# Patient Record
Sex: MALE | Race: WHITE | NOT HISPANIC OR LATINO
[De-identification: names, ages, dates, MRNs, and addresses within clinical notes are randomized per-mention and may not be internally consistent; named-entity substitution may affect disease eponyms.]

---

## 2017-01-03 ENCOUNTER — RESULT REVIEW (OUTPATIENT)
Age: 82
End: 2017-01-03

## 2017-01-03 ENCOUNTER — APPOINTMENT (OUTPATIENT)
Dept: INFUSION THERAPY | Facility: CLINIC | Age: 82
End: 2017-01-03

## 2017-01-03 LAB
ALBUMIN SERPL-MCNC: 3.6 G/DL
ALBUMIN/GLOB SERPL: 1.44
ALP SERPL-CCNC: 70 IU/L
ALT SERPL-CCNC: 14 IU/L
ANION GAP SERPL CALC-SCNC: 8 MEQ/L
AST SERPL-CCNC: 12 IU/L
BILIRUB SERPL-MCNC: 0.6 MG/DL
BUN SERPL-MCNC: 13 MG/DL
BUN/CREAT SERPL: 14.4 %
CALCIUM SERPL-MCNC: 8.5 MG/DL
CHLORIDE SERPL-SCNC: 114 MEQ/L
CO2 SERPL-SCNC: 20 MEQ/L
CREAT SERPL-MCNC: 0.9 MG/DL
GFR SERPL CREATININE-BSD FRML MDRD: 80
GLUCOSE SERPL-MCNC: 75 MG/DL
POTASSIUM SERPL-SCNC: 3.6 MMOL/L
PROT SERPL-MCNC: 6.1 G/DL
SODIUM SERPL-SCNC: 142 MEQ/L

## 2017-03-06 ENCOUNTER — APPOINTMENT (OUTPATIENT)
Dept: INFUSION THERAPY | Facility: CLINIC | Age: 82
End: 2017-03-06

## 2017-03-06 LAB
BASOPHILS # BLD: 0.05 TH/MM3
BASOPHILS NFR BLD: 0.8 %
EOSINOPHIL # BLD: 0.29 TH/MM3
EOSINOPHIL NFR BLD: 4.9 %
ERYTHROCYTE [DISTWIDTH] IN BLOOD BY AUTOMATED COUNT: 14.5 %
GRANULOCYTES # BLD: 3.89 TH/MM3
GRANULOCYTES NFR BLD: 65.4 %
HCT VFR BLD AUTO: 34.9 %
HGB BLD-MCNC: 11.4 G/DL
IMM GRANULOCYTES # BLD: 0.01 TH/MM3
IMM GRANULOCYTES NFR BLD: 0.2 %
LYMPHOCYTES # BLD: 1.16 TH/MM3
LYMPHOCYTES NFR BLD: 19.5 %
MCH RBC QN AUTO: 28.4 PG
MCHC RBC AUTO-ENTMCNC: 32.7 G/DL
MCV RBC AUTO: 87 FL
MONOCYTES # BLD: 0.55 TH/MM3
MONOCYTES NFR BLD: 9.2 %
PLATELET # BLD: 223 TH/MM3
PMV BLD AUTO: 9.2 FL
RBC # BLD AUTO: 4.01 MIL/MM3
WBC # BLD: 5.95 TH/MM3

## 2017-03-07 LAB
ALBUMIN SERPL-MCNC: 3.7 G/DL
ALBUMIN/GLOB SERPL: 1.48
ALP SERPL-CCNC: 71 IU/L
ALT SERPL-CCNC: 14 IU/L
ANION GAP SERPL CALC-SCNC: 10 MEQ/L
AST SERPL-CCNC: 15 IU/L
BILIRUB SERPL-MCNC: 0.8 MG/DL
BUN SERPL-MCNC: 12 MG/DL
BUN/CREAT SERPL: 14.8 %
CALCIUM SERPL-MCNC: 8.5 MG/DL
CHLORIDE SERPL-SCNC: 109 MEQ/L
CO2 SERPL-SCNC: 24 MEQ/L
CREAT SERPL-MCNC: 0.81 MG/DL
GFR SERPL CREATININE-BSD FRML MDRD: 91
GLUCOSE SERPL-MCNC: 68 MG/DL
POTASSIUM SERPL-SCNC: 4.1 MMOL/L
PROT SERPL-MCNC: 6.2 G/DL
SODIUM SERPL-SCNC: 143 MEQ/L

## 2017-03-27 ENCOUNTER — APPOINTMENT (OUTPATIENT)
Dept: HEMATOLOGY ONCOLOGY | Facility: CLINIC | Age: 82
End: 2017-03-27

## 2017-03-27 ENCOUNTER — APPOINTMENT (OUTPATIENT)
Dept: INFUSION THERAPY | Facility: CLINIC | Age: 82
End: 2017-03-27

## 2017-03-27 ENCOUNTER — OUTPATIENT (OUTPATIENT)
Dept: OUTPATIENT SERVICES | Facility: HOSPITAL | Age: 82
LOS: 1 days | Discharge: HOME | End: 2017-03-27

## 2017-03-27 VITALS — RESPIRATION RATE: 12 BRPM | SYSTOLIC BLOOD PRESSURE: 192 MMHG | HEART RATE: 82 BPM | DIASTOLIC BLOOD PRESSURE: 102 MMHG

## 2017-03-27 LAB
ALBUMIN SERPL-MCNC: 3.7 G/DL
ALBUMIN/GLOB SERPL: 1.37
ALP SERPL-CCNC: 66 IU/L
ALT SERPL-CCNC: 12 IU/L
ANION GAP SERPL CALC-SCNC: 12 MEQ/L
AST SERPL-CCNC: 12 IU/L
BASOPHILS # BLD: 0.06 TH/MM3
BASOPHILS NFR BLD: 0.9 %
BILIRUB SERPL-MCNC: 0.7 MG/DL
BUN SERPL-MCNC: 14 MG/DL
BUN/CREAT SERPL: 14.9 %
CALCIUM SERPL-MCNC: 8.9 MG/DL
CHLORIDE SERPL-SCNC: 111 MEQ/L
CO2 SERPL-SCNC: 21 MEQ/L
CREAT SERPL-MCNC: 0.94 MG/DL
EOSINOPHIL # BLD: 0.35 TH/MM3
EOSINOPHIL NFR BLD: 5.4 %
ERYTHROCYTE [DISTWIDTH] IN BLOOD BY AUTOMATED COUNT: 13.9 %
GFR SERPL CREATININE-BSD FRML MDRD: 76
GLUCOSE SERPL-MCNC: 74 MG/DL
GRANULOCYTES # BLD: 4.31 TH/MM3
GRANULOCYTES NFR BLD: 66.2 %
HCT VFR BLD AUTO: 34.9 %
HGB BLD-MCNC: 11.3 G/DL
IMM GRANULOCYTES # BLD: 0.01 TH/MM3
IMM GRANULOCYTES NFR BLD: 0.2 %
LYMPHOCYTES # BLD: 1.12 TH/MM3
LYMPHOCYTES NFR BLD: 17.2 %
MAGNESIUM SERPL-MCNC: 1.5 MG/DL
MCH RBC QN AUTO: 27.8 PG
MCHC RBC AUTO-ENTMCNC: 32.4 G/DL
MCV RBC AUTO: 86 FL
MONOCYTES # BLD: 0.66 TH/MM3
MONOCYTES NFR BLD: 10.1 %
PLATELET # BLD: 234 TH/MM3
PMV BLD AUTO: 9.4 FL
POTASSIUM SERPL-SCNC: 4 MMOL/L
PROT SERPL-MCNC: 6.4 G/DL
RBC # BLD AUTO: 4.06 MIL/MM3
SODIUM SERPL-SCNC: 144 MEQ/L
WBC # BLD: 6.51 TH/MM3

## 2017-03-28 LAB — TSH SERPL DL<=0.005 MIU/L-ACNC: 2.95 UIU/ML

## 2017-04-17 ENCOUNTER — APPOINTMENT (OUTPATIENT)
Dept: INFUSION THERAPY | Facility: CLINIC | Age: 82
End: 2017-04-17

## 2017-04-26 ENCOUNTER — APPOINTMENT (OUTPATIENT)
Dept: INFUSION THERAPY | Facility: CLINIC | Age: 82
End: 2017-04-26

## 2017-05-24 ENCOUNTER — APPOINTMENT (OUTPATIENT)
Dept: INFUSION THERAPY | Facility: CLINIC | Age: 82
End: 2017-05-24

## 2017-06-12 ENCOUNTER — APPOINTMENT (OUTPATIENT)
Dept: INFUSION THERAPY | Facility: CLINIC | Age: 82
End: 2017-06-12

## 2017-07-24 ENCOUNTER — OUTPATIENT (OUTPATIENT)
Dept: OUTPATIENT SERVICES | Facility: HOSPITAL | Age: 82
LOS: 1 days | Discharge: HOME | End: 2017-07-24

## 2017-07-24 DIAGNOSIS — I10 ESSENTIAL (PRIMARY) HYPERTENSION: ICD-10-CM

## 2017-07-24 DIAGNOSIS — C43.9 MALIGNANT MELANOMA OF SKIN, UNSPECIFIED: ICD-10-CM

## 2017-07-24 DIAGNOSIS — I50.9 HEART FAILURE, UNSPECIFIED: ICD-10-CM

## 2017-07-24 DIAGNOSIS — M48.00 SPINAL STENOSIS, SITE UNSPECIFIED: ICD-10-CM

## 2017-08-01 DIAGNOSIS — I49.01 VENTRICULAR FIBRILLATION: ICD-10-CM

## 2017-08-14 ENCOUNTER — APPOINTMENT (OUTPATIENT)
Dept: INFUSION THERAPY | Facility: CLINIC | Age: 82
End: 2017-08-14

## 2017-08-14 VITALS
HEART RATE: 76 BPM | SYSTOLIC BLOOD PRESSURE: 167 MMHG | RESPIRATION RATE: 14 BRPM | DIASTOLIC BLOOD PRESSURE: 102 MMHG | TEMPERATURE: 96.2 F

## 2017-09-11 ENCOUNTER — APPOINTMENT (OUTPATIENT)
Dept: INFUSION THERAPY | Facility: CLINIC | Age: 82
End: 2017-09-11

## 2017-09-11 ENCOUNTER — OUTPATIENT (OUTPATIENT)
Dept: OUTPATIENT SERVICES | Facility: HOSPITAL | Age: 82
LOS: 1 days | Discharge: HOME | End: 2017-09-11

## 2017-09-11 DIAGNOSIS — I10 ESSENTIAL (PRIMARY) HYPERTENSION: ICD-10-CM

## 2017-09-11 DIAGNOSIS — C43.9 MALIGNANT MELANOMA OF SKIN, UNSPECIFIED: ICD-10-CM

## 2017-09-11 DIAGNOSIS — I50.9 HEART FAILURE, UNSPECIFIED: ICD-10-CM

## 2017-09-11 DIAGNOSIS — M48.00 SPINAL STENOSIS, SITE UNSPECIFIED: ICD-10-CM

## 2017-09-16 ENCOUNTER — INPATIENT (INPATIENT)
Facility: HOSPITAL | Age: 82
LOS: 2 days | Discharge: HOME | End: 2017-09-19
Attending: INTERNAL MEDICINE

## 2017-09-16 DIAGNOSIS — C43.9 MALIGNANT MELANOMA OF SKIN, UNSPECIFIED: ICD-10-CM

## 2017-09-16 DIAGNOSIS — M48.00 SPINAL STENOSIS, SITE UNSPECIFIED: ICD-10-CM

## 2017-09-16 DIAGNOSIS — I50.9 HEART FAILURE, UNSPECIFIED: ICD-10-CM

## 2017-09-16 DIAGNOSIS — I10 ESSENTIAL (PRIMARY) HYPERTENSION: ICD-10-CM

## 2017-09-21 DIAGNOSIS — I50.9 HEART FAILURE, UNSPECIFIED: ICD-10-CM

## 2017-09-21 DIAGNOSIS — I42.0 DILATED CARDIOMYOPATHY: ICD-10-CM

## 2017-09-21 DIAGNOSIS — N17.9 ACUTE KIDNEY FAILURE, UNSPECIFIED: ICD-10-CM

## 2017-09-21 DIAGNOSIS — I25.10 ATHEROSCLEROTIC HEART DISEASE OF NATIVE CORONARY ARTERY WITHOUT ANGINA PECTORIS: ICD-10-CM

## 2017-09-21 DIAGNOSIS — Z98.1 ARTHRODESIS STATUS: ICD-10-CM

## 2017-09-21 DIAGNOSIS — Z96.653 PRESENCE OF ARTIFICIAL KNEE JOINT, BILATERAL: ICD-10-CM

## 2017-09-21 DIAGNOSIS — N40.0 BENIGN PROSTATIC HYPERPLASIA WITHOUT LOWER URINARY TRACT SYMPTOMS: ICD-10-CM

## 2017-09-21 DIAGNOSIS — I21.4 NON-ST ELEVATION (NSTEMI) MYOCARDIAL INFARCTION: ICD-10-CM

## 2017-09-21 DIAGNOSIS — L80 VITILIGO: ICD-10-CM

## 2017-09-21 DIAGNOSIS — Z90.49 ACQUIRED ABSENCE OF OTHER SPECIFIED PARTS OF DIGESTIVE TRACT: ICD-10-CM

## 2017-09-21 DIAGNOSIS — I11.0 HYPERTENSIVE HEART DISEASE WITH HEART FAILURE: ICD-10-CM

## 2017-09-21 DIAGNOSIS — Z85.820 PERSONAL HISTORY OF MALIGNANT MELANOMA OF SKIN: ICD-10-CM

## 2017-09-21 DIAGNOSIS — I50.23 ACUTE ON CHRONIC SYSTOLIC (CONGESTIVE) HEART FAILURE: ICD-10-CM

## 2017-09-21 DIAGNOSIS — K59.00 CONSTIPATION, UNSPECIFIED: ICD-10-CM

## 2017-09-21 DIAGNOSIS — Z87.891 PERSONAL HISTORY OF NICOTINE DEPENDENCE: ICD-10-CM

## 2017-09-29 ENCOUNTER — OUTPATIENT (OUTPATIENT)
Dept: OUTPATIENT SERVICES | Facility: HOSPITAL | Age: 82
LOS: 1 days | Discharge: HOME | End: 2017-09-29

## 2017-09-29 DIAGNOSIS — M48.00 SPINAL STENOSIS, SITE UNSPECIFIED: ICD-10-CM

## 2017-09-29 DIAGNOSIS — I50.9 HEART FAILURE, UNSPECIFIED: ICD-10-CM

## 2017-09-29 DIAGNOSIS — I25.10 ATHEROSCLEROTIC HEART DISEASE OF NATIVE CORONARY ARTERY WITHOUT ANGINA PECTORIS: ICD-10-CM

## 2017-09-29 DIAGNOSIS — C43.9 MALIGNANT MELANOMA OF SKIN, UNSPECIFIED: ICD-10-CM

## 2017-09-29 DIAGNOSIS — I10 ESSENTIAL (PRIMARY) HYPERTENSION: ICD-10-CM

## 2017-10-09 ENCOUNTER — APPOINTMENT (OUTPATIENT)
Dept: INFUSION THERAPY | Facility: CLINIC | Age: 82
End: 2017-10-09

## 2017-10-18 ENCOUNTER — APPOINTMENT (OUTPATIENT)
Dept: INFUSION THERAPY | Facility: CLINIC | Age: 82
End: 2017-10-18

## 2017-10-18 ENCOUNTER — APPOINTMENT (OUTPATIENT)
Dept: HEMATOLOGY ONCOLOGY | Facility: CLINIC | Age: 82
End: 2017-10-18

## 2017-10-23 ENCOUNTER — OUTPATIENT (OUTPATIENT)
Dept: OUTPATIENT SERVICES | Facility: HOSPITAL | Age: 82
LOS: 1 days | Discharge: HOME | End: 2017-10-23

## 2017-10-23 DIAGNOSIS — I10 ESSENTIAL (PRIMARY) HYPERTENSION: ICD-10-CM

## 2017-10-23 DIAGNOSIS — I51.9 HEART DISEASE, UNSPECIFIED: ICD-10-CM

## 2017-10-23 DIAGNOSIS — M48.00 SPINAL STENOSIS, SITE UNSPECIFIED: ICD-10-CM

## 2017-10-23 DIAGNOSIS — C43.9 MALIGNANT MELANOMA OF SKIN, UNSPECIFIED: ICD-10-CM

## 2017-10-23 DIAGNOSIS — I50.9 HEART FAILURE, UNSPECIFIED: ICD-10-CM

## 2017-11-01 DIAGNOSIS — I25.10 ATHEROSCLEROTIC HEART DISEASE OF NATIVE CORONARY ARTERY WITHOUT ANGINA PECTORIS: ICD-10-CM

## 2017-11-13 ENCOUNTER — APPOINTMENT (OUTPATIENT)
Dept: INFUSION THERAPY | Facility: CLINIC | Age: 82
End: 2017-11-13

## 2017-11-13 ENCOUNTER — APPOINTMENT (OUTPATIENT)
Dept: HEMATOLOGY ONCOLOGY | Facility: CLINIC | Age: 82
End: 2017-11-13

## 2017-11-13 VITALS
TEMPERATURE: 96.6 F | SYSTOLIC BLOOD PRESSURE: 141 MMHG | DIASTOLIC BLOOD PRESSURE: 70 MMHG | HEART RATE: 82 BPM | HEIGHT: 69 IN | WEIGHT: 212 LBS | BODY MASS INDEX: 31.4 KG/M2 | RESPIRATION RATE: 14 BRPM

## 2017-12-11 ENCOUNTER — APPOINTMENT (OUTPATIENT)
Dept: INFUSION THERAPY | Facility: CLINIC | Age: 82
End: 2017-12-11

## 2018-01-08 ENCOUNTER — APPOINTMENT (OUTPATIENT)
Dept: INFUSION THERAPY | Facility: CLINIC | Age: 83
End: 2018-01-08

## 2018-02-05 ENCOUNTER — APPOINTMENT (OUTPATIENT)
Dept: INFUSION THERAPY | Facility: CLINIC | Age: 83
End: 2018-02-05

## 2018-04-09 ENCOUNTER — OUTPATIENT (OUTPATIENT)
Dept: OUTPATIENT SERVICES | Facility: HOSPITAL | Age: 83
LOS: 1 days | Discharge: HOME | End: 2018-04-09

## 2018-04-09 ENCOUNTER — APPOINTMENT (OUTPATIENT)
Dept: INFUSION THERAPY | Facility: CLINIC | Age: 83
End: 2018-04-09

## 2018-04-09 DIAGNOSIS — C43.9 MALIGNANT MELANOMA OF SKIN, UNSPECIFIED: ICD-10-CM

## 2018-05-14 ENCOUNTER — APPOINTMENT (OUTPATIENT)
Dept: HEMATOLOGY ONCOLOGY | Facility: CLINIC | Age: 83
End: 2018-05-14

## 2018-05-14 ENCOUNTER — APPOINTMENT (OUTPATIENT)
Dept: INFUSION THERAPY | Facility: CLINIC | Age: 83
End: 2018-05-14

## 2018-05-14 VITALS
HEIGHT: 69 IN | WEIGHT: 208 LBS | RESPIRATION RATE: 14 BRPM | SYSTOLIC BLOOD PRESSURE: 153 MMHG | HEART RATE: 87 BPM | DIASTOLIC BLOOD PRESSURE: 87 MMHG | BODY MASS INDEX: 30.81 KG/M2 | TEMPERATURE: 96.2 F

## 2018-05-14 NOTE — HISTORY OF PRESENT ILLNESS
[Disease: _____________________] : Disease: [unfilled] [AJCC Stage: ____] : AJCC Stage: [unfilled] [de-identified] : This is an 84-year-old male with metastatic melanoma on Keytruda q3 weeks since 01/2015.  He follows with Dr. Sloan at [facility].  His most recent CT chest, abdomen, pelvis at [facility] in 09/2016 showed an unchanged LLL nodule and ileocolic meseneteric lymph nodes which have mildly decreased in size.  He is due for a PETCT tomorrow and has an appointment with Dr. Sloan next week.  \par \par He has no new complaints on therapy.  He has chronic intermittent diarrhea which is controlled on medication.   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  He has been on steroid creams and now is being treated with Kenalog injections under the care of Dr. Bowen.  He notes that the rash is improving.\par \par He has no other complaints.  Denies fever, shortness of breath, or chronic fatigue.  Denies any pain. [de-identified] : BRAF+ [de-identified] : His oncologic history is summarized below:\par \par 2002, primary diagnosis of Melanoma status post wide local excision, biopsy consistent with melanoma. \par 09/06/2013, laparoscopic surgery and resection of small bowel metastases.   09/28/2014, laparoscopy with lysis of adhesions.  Biopsy with mesenteric nodules negative for melanoma.  \par 06/13/2014 until 08/28/2014, vemurafenib.  This was stopped due to phototoxicity.\par 09/23/2014 until 12/11/2014, ipilimumab.  This was stopped due to progression.  \par 11/07/2014, resection of cecal metastasis and regional lymph nodes.  01/08/2015 until present, pembrolizumab, last dose 10/10/2016.\par  [de-identified] : 3/27/17\par He returned from [place]. Was treated there. States his PET/CT from December was fine. Has a new PET/CT scheduled for next Monday.  His treatment may be stopped if he is DEBRA as per patient. Skin rash is the same.\par \par 11/13/17\par He is feeling well. No acute rash, just scarring from the old rash. He reports a PET/CT in [facility] that was unchanged.  He also had a NSTEMI in September. He states he had a Cath and no intervention was needed just medical therapy. \par \par 5/14/18\par He is here for follow up. He reports CT scans in [facility] that were DEBRA from 3/5/18. Blood work from same day hgb 12.36, wbc 5, olts 215,  alb 4.2, alk 73, ast/alt 10/12, bili 0.5 cr. 0.9.  He feels well.

## 2018-05-14 NOTE — ASSESSMENT
[FreeTextEntry1] : 85/M with BRAF+ metastatic melanoma with disease involving the lung and intraabdominal lymph nodes and has been off Keytruda since April 2012  given 2 years of therapy and essentially unchanged scans. On observation.\par  \par Plan:\par -CT scan was DEBRA on 3/2018 now he states he obtains them every 4 months in [place] and blodo work there as well\par -Port Flush monthly \par -RTC in 6 months sooner if therapy needs to be restarted he will let me know

## 2018-05-14 NOTE — REASON FOR VISIT
[Follow-Up Visit] : a follow-up [Pre-Treatment Visit] : a pre-treatment [Spouse] : spouse [FreeTextEntry2] : metastatic melanoma

## 2018-05-14 NOTE — PHYSICAL EXAM
[Restricted in physically strenuous activity but ambulatory and able to carry out work of a light or sedentary nature] : Status 1- Restricted in physically strenuous activity but ambulatory and able to carry out work of a light or sedentary nature, e.g., light house work, office work [Normal] : grossly intact [de-identified] : No adenopathy [de-identified] : Has a lipoma on back [de-identified] : Has a lipoma on right forearm [de-identified] : has viteliog like areas

## 2018-06-11 ENCOUNTER — OUTPATIENT (OUTPATIENT)
Dept: OUTPATIENT SERVICES | Facility: HOSPITAL | Age: 83
LOS: 1 days | Discharge: HOME | End: 2018-06-11

## 2018-06-11 ENCOUNTER — APPOINTMENT (OUTPATIENT)
Dept: INFUSION THERAPY | Facility: CLINIC | Age: 83
End: 2018-06-11

## 2018-06-11 DIAGNOSIS — C43.9 MALIGNANT MELANOMA OF SKIN, UNSPECIFIED: ICD-10-CM

## 2018-07-09 ENCOUNTER — APPOINTMENT (OUTPATIENT)
Dept: INFUSION THERAPY | Facility: CLINIC | Age: 83
End: 2018-07-09

## 2018-07-17 ENCOUNTER — OUTPATIENT (OUTPATIENT)
Dept: OUTPATIENT SERVICES | Facility: HOSPITAL | Age: 83
LOS: 1 days | Discharge: HOME | End: 2018-07-17

## 2018-07-17 DIAGNOSIS — K21.9 GASTRO-ESOPHAGEAL REFLUX DISEASE WITHOUT ESOPHAGITIS: ICD-10-CM

## 2018-07-17 DIAGNOSIS — I10 ESSENTIAL (PRIMARY) HYPERTENSION: ICD-10-CM

## 2018-07-17 DIAGNOSIS — I25.10 ATHEROSCLEROTIC HEART DISEASE OF NATIVE CORONARY ARTERY WITHOUT ANGINA PECTORIS: ICD-10-CM

## 2018-08-03 ENCOUNTER — APPOINTMENT (OUTPATIENT)
Dept: UROLOGY | Facility: CLINIC | Age: 83
End: 2018-08-03
Payer: MEDICARE

## 2018-08-03 VITALS
DIASTOLIC BLOOD PRESSURE: 90 MMHG | BODY MASS INDEX: 31.1 KG/M2 | WEIGHT: 210 LBS | HEART RATE: 81 BPM | SYSTOLIC BLOOD PRESSURE: 146 MMHG | HEIGHT: 69 IN

## 2018-08-03 LAB
BILIRUB UR QL STRIP: NORMAL
CLARITY UR: CLEAR
COLLECTION METHOD: NORMAL
GLUCOSE UR-MCNC: NORMAL
HCG UR QL: NORMAL EU/DL
HGB UR QL STRIP.AUTO: NORMAL
KETONES UR-MCNC: NORMAL
LEUKOCYTE ESTERASE UR QL STRIP: NORMAL
NITRITE UR QL STRIP: NORMAL
PH UR STRIP: 5
PROT UR STRIP-MCNC: NORMAL
SP GR UR STRIP: 1.02

## 2018-08-03 PROCEDURE — 99213 OFFICE O/P EST LOW 20 MIN: CPT

## 2018-08-03 PROCEDURE — 81003 URINALYSIS AUTO W/O SCOPE: CPT | Mod: QW

## 2018-08-07 ENCOUNTER — APPOINTMENT (OUTPATIENT)
Dept: INFUSION THERAPY | Facility: CLINIC | Age: 83
End: 2018-08-07

## 2018-08-09 ENCOUNTER — OUTPATIENT (OUTPATIENT)
Dept: OUTPATIENT SERVICES | Facility: HOSPITAL | Age: 83
LOS: 1 days | Discharge: HOME | End: 2018-08-09

## 2018-08-09 DIAGNOSIS — K21.9 GASTRO-ESOPHAGEAL REFLUX DISEASE WITHOUT ESOPHAGITIS: ICD-10-CM

## 2018-08-09 DIAGNOSIS — I10 ESSENTIAL (PRIMARY) HYPERTENSION: ICD-10-CM

## 2018-08-09 DIAGNOSIS — C61 MALIGNANT NEOPLASM OF PROSTATE: ICD-10-CM

## 2018-08-09 DIAGNOSIS — I25.10 ATHEROSCLEROTIC HEART DISEASE OF NATIVE CORONARY ARTERY WITHOUT ANGINA PECTORIS: ICD-10-CM

## 2018-08-12 LAB — PSA SERPL-MCNC: 1.61 NG/ML

## 2018-09-10 ENCOUNTER — TRANSCRIPTION ENCOUNTER (OUTPATIENT)
Age: 83
End: 2018-09-10

## 2018-09-10 ENCOUNTER — APPOINTMENT (OUTPATIENT)
Dept: INFUSION THERAPY | Facility: CLINIC | Age: 83
End: 2018-09-10

## 2018-10-16 ENCOUNTER — APPOINTMENT (OUTPATIENT)
Dept: INFUSION THERAPY | Facility: CLINIC | Age: 83
End: 2018-10-16

## 2018-10-23 DIAGNOSIS — I10 ESSENTIAL (PRIMARY) HYPERTENSION: ICD-10-CM

## 2018-10-23 DIAGNOSIS — C79.89 SECONDARY MALIGNANT NEOPLASM OF OTHER SPECIFIED SITES: ICD-10-CM

## 2018-10-23 DIAGNOSIS — Z51.11 ENCOUNTER FOR ANTINEOPLASTIC CHEMOTHERAPY: ICD-10-CM

## 2018-10-23 DIAGNOSIS — C43.9 MALIGNANT MELANOMA OF SKIN, UNSPECIFIED: ICD-10-CM

## 2018-11-05 ENCOUNTER — APPOINTMENT (OUTPATIENT)
Dept: INFUSION THERAPY | Facility: CLINIC | Age: 83
End: 2018-11-05

## 2018-11-13 ENCOUNTER — OUTPATIENT (OUTPATIENT)
Dept: OUTPATIENT SERVICES | Facility: HOSPITAL | Age: 83
LOS: 1 days | Discharge: HOME | End: 2018-11-13

## 2018-11-13 DIAGNOSIS — I10 ESSENTIAL (PRIMARY) HYPERTENSION: ICD-10-CM

## 2018-11-13 DIAGNOSIS — R05 COUGH: ICD-10-CM

## 2018-11-13 DIAGNOSIS — I25.10 ATHEROSCLEROTIC HEART DISEASE OF NATIVE CORONARY ARTERY WITHOUT ANGINA PECTORIS: ICD-10-CM

## 2018-12-03 ENCOUNTER — APPOINTMENT (OUTPATIENT)
Dept: INFUSION THERAPY | Facility: CLINIC | Age: 83
End: 2018-12-03

## 2018-12-03 ENCOUNTER — APPOINTMENT (OUTPATIENT)
Dept: HEMATOLOGY ONCOLOGY | Facility: CLINIC | Age: 83
End: 2018-12-03

## 2019-07-25 ENCOUNTER — TRANSCRIPTION ENCOUNTER (OUTPATIENT)
Age: 84
End: 2019-07-25

## 2019-08-07 ENCOUNTER — OUTPATIENT (OUTPATIENT)
Dept: OUTPATIENT SERVICES | Facility: HOSPITAL | Age: 84
LOS: 1 days | Discharge: HOME | End: 2019-08-07

## 2019-08-07 DIAGNOSIS — E78.5 HYPERLIPIDEMIA, UNSPECIFIED: ICD-10-CM

## 2019-08-07 DIAGNOSIS — I10 ESSENTIAL (PRIMARY) HYPERTENSION: ICD-10-CM

## 2019-08-07 DIAGNOSIS — C43.9 MALIGNANT MELANOMA OF SKIN, UNSPECIFIED: ICD-10-CM

## 2019-08-13 ENCOUNTER — APPOINTMENT (OUTPATIENT)
Dept: UROLOGY | Facility: CLINIC | Age: 84
End: 2019-08-13
Payer: MEDICARE

## 2019-08-13 VITALS — HEIGHT: 69 IN | BODY MASS INDEX: 31.1 KG/M2 | WEIGHT: 210 LBS

## 2019-08-13 VITALS
SYSTOLIC BLOOD PRESSURE: 173 MMHG | BODY MASS INDEX: 31.1 KG/M2 | DIASTOLIC BLOOD PRESSURE: 89 MMHG | HEIGHT: 69 IN | HEART RATE: 79 BPM | WEIGHT: 210 LBS

## 2019-08-13 DIAGNOSIS — Z00.00 ENCOUNTER FOR GENERAL ADULT MEDICAL EXAMINATION W/OUT ABNORMAL FINDINGS: ICD-10-CM

## 2019-08-13 PROCEDURE — 99213 OFFICE O/P EST LOW 20 MIN: CPT

## 2019-08-13 NOTE — ASSESSMENT
[FreeTextEntry1] : No signs or symptoms of progression of prostate cancer.Regarding urinary incontinence again the risks benefits alternatives fully discussed and patient declines Medicine, Botox, tibial nerve stimulation

## 2019-08-13 NOTE — PHYSICAL EXAM
[General Appearance - In No Acute Distress] : no acute distress [Prostate Tenderness] : the prostate was not tender [No Prostate Nodules] : no prostate nodules [Prostate Size ___ (0-4)] : prostate size [unfilled] (scale: 0-4) [Edema] : no peripheral edema [] : no respiratory distress [Normal Station and Gait] : the gait and station were normal for the patient's age [Oriented To Time, Place, And Person] : oriented to person, place, and time

## 2019-08-13 NOTE — HISTORY OF PRESENT ILLNESS
[FreeTextEntry1] : A [age]-year-old with incidental focus of Leonard 6 prostate cancer found during suprapubic prostatectomy in 2011. PSA this year was 1.6 compared to 1.3 1 year ago. He also has severe overactive bladder intractable to multiple bladder relaxants and underwent pelvic external electrical stimulation without relief. He continues to have urinary incontinence and uses a diaper

## 2019-11-04 ENCOUNTER — APPOINTMENT (OUTPATIENT)
Dept: CARDIOLOGY | Facility: CLINIC | Age: 84
End: 2019-11-04

## 2019-11-18 ENCOUNTER — OUTPATIENT (OUTPATIENT)
Dept: OUTPATIENT SERVICES | Facility: HOSPITAL | Age: 84
LOS: 1 days | Discharge: HOME | End: 2019-11-18

## 2019-11-18 DIAGNOSIS — E03.9 HYPOTHYROIDISM, UNSPECIFIED: ICD-10-CM

## 2019-11-18 DIAGNOSIS — I25.10 ATHEROSCLEROTIC HEART DISEASE OF NATIVE CORONARY ARTERY WITHOUT ANGINA PECTORIS: ICD-10-CM

## 2019-11-18 DIAGNOSIS — I10 ESSENTIAL (PRIMARY) HYPERTENSION: ICD-10-CM

## 2019-11-18 DIAGNOSIS — C43.9 MALIGNANT MELANOMA OF SKIN, UNSPECIFIED: ICD-10-CM

## 2019-11-27 ENCOUNTER — APPOINTMENT (OUTPATIENT)
Dept: ORTHOPEDIC SURGERY | Facility: CLINIC | Age: 84
End: 2019-11-27
Payer: MEDICARE

## 2019-11-27 DIAGNOSIS — M25.552 PAIN IN LEFT HIP: ICD-10-CM

## 2019-11-27 DIAGNOSIS — Z86.79 PERSONAL HISTORY OF OTHER DISEASES OF THE CIRCULATORY SYSTEM: ICD-10-CM

## 2019-11-27 PROCEDURE — 99214 OFFICE O/P EST MOD 30 MIN: CPT

## 2019-11-27 PROCEDURE — 73502 X-RAY EXAM HIP UNI 2-3 VIEWS: CPT | Mod: LT

## 2019-11-27 PROCEDURE — 73564 X-RAY EXAM KNEE 4 OR MORE: CPT | Mod: 50

## 2019-11-27 NOTE — PHYSICAL EXAM
[de-identified] : Left Knee\par Inspection: no effusion\par Wounds: healed midline incision, no drainage or erythema\par Alignment: normal.\par Palpation: no specific tenderness on palpation.\par ROM: 0-110 degrees \par Muscle Test: good quad strength.\par Leg examination: calf is soft and non-tender.\par \par Right Knee\par Inspection: no effusion\par Wounds: healed midline incision\par Alignment: normal.\par Palpation: no specific tenderness on palpation.\par ROM: 0-110 degrees \par Muscle Test: good quad strength.\par Leg examination: calf is soft and non-tender.\par \par Left hip\par Inspection: No swelling or ecchymosis.\par Wounds: none.\par Palpation: non-tender.\par Stability: no instability.\par Strength: 5/5 all motor groups.\par ROM: Flexion to 90 degrees ,ER 30 degrees, ABD 45 degrees \par Leg length: equal.\par \par Right hip\par Inspection: No swelling or ecchymosis.\par Wounds: none.\par Palpation: non-tender.\par Stability: no instability.\par Strength: 5/5 all motor groups.\par ROM: Flexion to 90 degrees ,ER 30 degrees, ABD 45 degrees \par Leg length: equal. [de-identified] : Radiographs done today AP lateral and skyline of both knees shows bilateral cemented PS TKAs\par \par Radiographs done today AP pelvis and lateral of the left hip shows the bony structures are intact , no fractures or dislocations with well maintained joint spaces

## 2019-11-27 NOTE — ASSESSMENT
[FreeTextEntry1] : S/p bilateral total knees, left knee 2000, right knee 2001. He come sin with less than a week Hx of acute atraumatic left hip pain which has already resolved. He was instructed to contact us if pain returns, otherwise we can see him back in 2-3 years for his knee replacements.

## 2019-11-27 NOTE — HISTORY OF PRESENT ILLNESS
[Pain Location] : pain [] : left hip [0] : a current pain level of 0/10 [6] : a maximum pain level of 6/10 [Intermit.] : ~He/She~ states the symptoms seem to be intermittent [de-identified] : 87 year old male s/p bilateral total knee replacements, right knee in 2001 and left knee in 2000 presents to the office today complaining of left hip pain since the past weekend.He reports doing very well with his bilateral total knee replacements. He reports left hip pain that is sharp in nature. He reports buckling, but denies any swelling, locking, clicking, or loss of motion. He denies any pain with ambulation and reports doing okay with negotiating stairs. Pt states the sharp pain comes on quick and leaves just as quick. He denies taking anything for pain at this point. Pt is here today for precautionary measures of the left hip.

## 2019-12-11 ENCOUNTER — APPOINTMENT (OUTPATIENT)
Dept: CARDIOLOGY | Facility: CLINIC | Age: 84
End: 2019-12-11
Payer: MEDICARE

## 2019-12-11 PROCEDURE — 99214 OFFICE O/P EST MOD 30 MIN: CPT

## 2019-12-11 PROCEDURE — 93000 ELECTROCARDIOGRAM COMPLETE: CPT

## 2020-09-04 ENCOUNTER — APPOINTMENT (OUTPATIENT)
Dept: UROLOGY | Facility: CLINIC | Age: 85
End: 2020-09-04
Payer: MEDICARE

## 2020-09-04 VITALS
DIASTOLIC BLOOD PRESSURE: 77 MMHG | BODY MASS INDEX: 30.66 KG/M2 | WEIGHT: 207 LBS | TEMPERATURE: 98.1 F | HEART RATE: 61 BPM | SYSTOLIC BLOOD PRESSURE: 143 MMHG | HEIGHT: 69 IN

## 2020-09-04 DIAGNOSIS — C61 MALIGNANT NEOPLASM OF PROSTATE: ICD-10-CM

## 2020-09-04 DIAGNOSIS — R32 UNSPECIFIED URINARY INCONTINENCE: ICD-10-CM

## 2020-09-04 PROCEDURE — 99213 OFFICE O/P EST LOW 20 MIN: CPT

## 2020-09-04 NOTE — PHYSICAL EXAM
[General Appearance - In No Acute Distress] : no acute distress [] : no respiratory distress [Oriented To Time, Place, And Person] : oriented to person, place, and time [Not Anxious] : not anxious [Normal Station and Gait] : the gait and station were normal for the patient's age [FreeTextEntry1] : Declines RAJ

## 2020-09-04 NOTE — ASSESSMENT
[FreeTextEntry1] : No progression of prostate cancer. Patient declines any treatment for urinary incontinence

## 2020-09-04 NOTE — HISTORY OF PRESENT ILLNESS
[Urinary Incontinence] : urinary incontinence [Straining] : no straining [FreeTextEntry1] : 87-year-old with prostate cancer found incidentally during suprapubic prostatectomy. There is no change in urinary incontinence since last visit. PSA is 1.2, Which is stable.He has failed multiple medications and external pelvic electrical stimulation without relief. [Dysuria] : no dysuria [Weak Stream] : no weak stream [Hematuria - Gross] : no gross hematuria

## 2020-09-04 NOTE — REVIEW OF SYSTEMS
[Feeling Poorly] : not feeling poorly [Feeling Tired] : not feeling tired [Chest Pain] : no chest pain [Cough] : no cough [Nosebleeds] : no nosebleeds [Constipation] : no constipation [Diarrhea] : no diarrhea [Dysuria] : no dysuria [Confused] : no confusion

## 2020-09-08 ENCOUNTER — RECORD ABSTRACTING (OUTPATIENT)
Age: 85
End: 2020-09-08

## 2020-09-08 DIAGNOSIS — I25.2 OLD MYOCARDIAL INFARCTION: ICD-10-CM

## 2020-09-08 DIAGNOSIS — Z87.898 PERSONAL HISTORY OF OTHER SPECIFIED CONDITIONS: ICD-10-CM

## 2020-09-23 ENCOUNTER — APPOINTMENT (OUTPATIENT)
Dept: CARDIOLOGY | Facility: CLINIC | Age: 85
End: 2020-09-23
Payer: MEDICARE

## 2020-09-23 VITALS
HEIGHT: 69 IN | SYSTOLIC BLOOD PRESSURE: 150 MMHG | HEART RATE: 66 BPM | WEIGHT: 210 LBS | DIASTOLIC BLOOD PRESSURE: 90 MMHG | BODY MASS INDEX: 31.1 KG/M2 | TEMPERATURE: 97.9 F | OXYGEN SATURATION: 95 % | RESPIRATION RATE: 14 BRPM

## 2020-09-23 PROCEDURE — 93000 ELECTROCARDIOGRAM COMPLETE: CPT

## 2020-09-23 PROCEDURE — 99213 OFFICE O/P EST LOW 20 MIN: CPT

## 2020-09-23 RX ORDER — OMEPRAZOLE 40 MG/1
40 CAPSULE, DELAYED RELEASE ORAL
Qty: 30 | Refills: 1 | Status: ACTIVE | COMMUNITY

## 2020-09-23 RX ORDER — METOPROLOL SUCCINATE 25 MG/1
25 TABLET, EXTENDED RELEASE ORAL
Refills: 3 | Status: ACTIVE | COMMUNITY

## 2020-09-23 RX ORDER — DICYCLOMINE HYDROCHLORIDE 20 MG/1
20 TABLET ORAL 4 TIMES DAILY
Refills: 0 | Status: ACTIVE | COMMUNITY

## 2020-09-23 RX ORDER — SACCHAROMYCES BOULARDII 50 MG
CAPSULE ORAL
Refills: 0 | Status: DISCONTINUED | COMMUNITY
End: 2020-09-23

## 2020-09-23 RX ORDER — BACILLUS COAGULANS/INULIN 1B-250 MG
CAPSULE ORAL
Refills: 0 | Status: DISCONTINUED | COMMUNITY
End: 2020-09-23

## 2020-09-23 RX ORDER — SUCRALFATE 1 G/1
1 TABLET ORAL 4 TIMES DAILY
Refills: 0 | Status: ACTIVE | COMMUNITY

## 2020-09-23 RX ORDER — COLESEVELAM HYDROCHLORIDE 625 MG/1
625 TABLET, FILM COATED ORAL
Refills: 0 | Status: DISCONTINUED | COMMUNITY
End: 2020-09-23

## 2020-09-23 RX ORDER — BUTALBITAL, ACETAMINOPHEN, AND CAFFEINE 50; 300; 40 MG/1; MG/1; MG/1
CAPSULE ORAL
Refills: 0 | Status: DISCONTINUED | COMMUNITY
End: 2020-09-23

## 2020-09-23 RX ORDER — SACUBITRIL AND VALSARTAN 49; 51 MG/1; MG/1
49-51 TABLET, FILM COATED ORAL
Refills: 0 | Status: COMPLETED | COMMUNITY
End: 2020-09-23

## 2020-09-23 RX ORDER — LEVOTHYROXINE SODIUM 0.03 MG/1
25 TABLET ORAL DAILY
Refills: 0 | Status: ACTIVE | COMMUNITY

## 2020-09-23 NOTE — ASSESSMENT
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 \par vertigo epiosode \par  EF not clear better on [facility]osp in [place]  and xray suggesting volume overload tx with lasix since there are dscordant result iwould repeat all test echo [facility] p thall not clear since some records past suggested MR \par also reccurrent colon ca off keytruda for now and appparently stable   had miga 2016 53% s/p hosp 2017 with some CHF , equivocal NSTEMI EF by Echo 35% had cath diagnol disease 2017 other coroanries stable appears more DCM repeat MUGA 53% again mild dilated LV saw partner for elevated BP feels well now recent PET (-) LDL 66\par \par repeat echo to evlauate MR and EF

## 2020-09-23 NOTE — PHYSICAL EXAM
[General Appearance - Well Developed] : well developed [Normal Appearance] : normal appearance [Well Groomed] : well groomed [General Appearance - Well Nourished] : well nourished [No Deformities] : no deformities [General Appearance - In No Acute Distress] : no acute distress [Normal Conjunctiva] : the conjunctiva exhibited no abnormalities [Eyelids - No Xanthelasma] : the eyelids demonstrated no xanthelasmas [Normal Oral Mucosa] : normal oral mucosa [No Oral Pallor] : no oral pallor [No Oral Cyanosis] : no oral cyanosis [] : no respiratory distress [Auscultation Breath Sounds / Voice Sounds] : lungs were clear to auscultation bilaterally [Heart Rate And Rhythm] : heart rate and rhythm were normal [Heart Sounds] : normal S1 and S2 [Murmurs] : no murmurs present [Arterial Pulses Normal] : the arterial pulses were normal [Edema] : no peripheral edema present [Bowel Sounds] : normal bowel sounds [Abdomen Tenderness] : non-tender [Abdomen Mass (___ Cm)] : no abdominal mass palpated [Nail Clubbing] : no clubbing of the fingernails [Cyanosis, Localized] : no localized cyanosis [Oriented To Time, Place, And Person] : oriented to person, place, and time [FreeTextEntry1] : No JVD

## 2020-10-05 ENCOUNTER — APPOINTMENT (OUTPATIENT)
Dept: CARDIOLOGY | Facility: CLINIC | Age: 85
End: 2020-10-05
Payer: MEDICARE

## 2020-10-05 PROCEDURE — 93306 TTE W/DOPPLER COMPLETE: CPT

## 2020-11-09 ENCOUNTER — APPOINTMENT (OUTPATIENT)
Dept: CARDIOLOGY | Facility: CLINIC | Age: 85
End: 2020-11-09
Payer: MEDICARE

## 2020-11-09 VITALS
BODY MASS INDEX: 31.84 KG/M2 | HEIGHT: 69 IN | HEART RATE: 87 BPM | WEIGHT: 215 LBS | TEMPERATURE: 97.6 F | SYSTOLIC BLOOD PRESSURE: 120 MMHG | DIASTOLIC BLOOD PRESSURE: 80 MMHG

## 2020-11-09 DIAGNOSIS — Z85.820 PERSONAL HISTORY OF MALIGNANT MELANOMA OF SKIN: ICD-10-CM

## 2020-11-09 DIAGNOSIS — N40.0 BENIGN PROSTATIC HYPERPLASIA WITHOUT LOWER URINARY TRACT SYMPMS: ICD-10-CM

## 2020-11-09 DIAGNOSIS — Z87.19 PERSONAL HISTORY OF OTHER DISEASES OF THE DIGESTIVE SYSTEM: ICD-10-CM

## 2020-11-09 DIAGNOSIS — I35.0 NONRHEUMATIC AORTIC (VALVE) STENOSIS: ICD-10-CM

## 2020-11-09 DIAGNOSIS — I45.10 UNSPECIFIED RIGHT BUNDLE-BRANCH BLOCK: ICD-10-CM

## 2020-11-09 DIAGNOSIS — Z87.898 PERSONAL HISTORY OF OTHER SPECIFIED CONDITIONS: ICD-10-CM

## 2020-11-09 DIAGNOSIS — I50.22 CHRONIC SYSTOLIC (CONGESTIVE) HEART FAILURE: ICD-10-CM

## 2020-11-09 DIAGNOSIS — E78.5 HYPERLIPIDEMIA, UNSPECIFIED: ICD-10-CM

## 2020-11-09 PROCEDURE — 93000 ELECTROCARDIOGRAM COMPLETE: CPT

## 2020-11-09 PROCEDURE — 99213 OFFICE O/P EST LOW 20 MIN: CPT

## 2020-11-09 NOTE — ASSESSMENT
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 \par vertigo epiosode \par  EF not clear better on [facility]osp in [place]  and xray suggesting volume overload tx with lasix since there are dscordant result iwould repeat all test echo muga p thall not clear since some records past suggested MR \par also reccurrent colon ca off keytruda for now and appparently stable   had miga  53% s/p hosp 2017 with some CHF , equivocal NSTEMI EF by Echo 35% had cath diagnol disease 2017 other coroanries stable appears more DCM repeat MUGA 53% again mild dilated LV  saw partner for elevated BP feels well now recent PET (-) LDL 66\par \par repeat echo to evlauate MR and EF EF now 40% MR no change will maintain current med regimen \par \par

## 2021-01-28 ENCOUNTER — APPOINTMENT (OUTPATIENT)
Dept: ORTHOPEDIC SURGERY | Facility: HOSPITAL | Age: 86
End: 2021-01-28
Payer: MEDICARE

## 2021-01-28 VITALS — TEMPERATURE: 98 F

## 2021-01-28 DIAGNOSIS — Z91.81 HISTORY OF FALLING: ICD-10-CM

## 2021-01-28 DIAGNOSIS — Z96.651 PRESENCE OF RIGHT ARTIFICIAL KNEE JOINT: ICD-10-CM

## 2021-01-28 DIAGNOSIS — Z96.652 PRESENCE OF LEFT ARTIFICIAL KNEE JOINT: ICD-10-CM

## 2021-01-28 DIAGNOSIS — S80.02XA CONTUSION OF LEFT KNEE, INITIAL ENCOUNTER: ICD-10-CM

## 2021-01-28 PROCEDURE — 99213 OFFICE O/P EST LOW 20 MIN: CPT

## 2021-01-28 PROCEDURE — 73564 X-RAY EXAM KNEE 4 OR MORE: CPT | Mod: RT

## 2021-01-28 RX ORDER — PNV NO.95/FERROUS FUM/FOLIC AC 28MG-0.8MG
TABLET ORAL
Refills: 0 | Status: ACTIVE | COMMUNITY

## 2021-01-28 RX ORDER — DIPHENHYDRAMINE HCL 50 MG/1
50 CAPSULE ORAL
Qty: 1 | Refills: 0 | Status: DISCONTINUED | COMMUNITY
Start: 2020-10-15 | End: 2021-01-28

## 2021-01-28 NOTE — HISTORY OF PRESENT ILLNESS
[de-identified] : 88 year old male s/p BTK, the left in 2000 and the right in 2001, presents to the office today after a fall.

## 2021-01-28 NOTE — ASSESSMENT
[FreeTextEntry1] : S/p bilateral total knees, left in 2000, right in 2001. Pt presents after sustaining a fall. Just after the fall he had difficulty moving the knee but thankfully he is feeling much better today. He came in under an abundance of caution. His exam and radiographs are completely benign, he was told he has a contusion. F/u in 3 years.

## 2021-01-28 NOTE — PHYSICAL EXAM
[de-identified] : Left Knee\par Inspection: no effusion or erythema.Skin intact. \par Wounds: well healed incision \par Alignment: normal.\par Palpation: no specific tenderness on palpation.\par ROM: 0-95 degrees, good stability \par Ligamentous laxity: all ligaments appear stable\par Muscle Test: good quad strength.\par Leg examination: calf is soft and non-tender.\par \par Right knee\par Inspection: no effusion or erythema. Skin intact. \par Wounds: well healed incision \par Alignment: normal.\par Palpation: no specific tenderness on palpation.\par ROM: 0-100 degrees , good stability \par Ligamentous laxity: all ligaments appear \par Muscle Test: good quad strength.\par Leg examination: calf is soft and non-tender.\par   [de-identified] : Radiographs done today AP lateral and skyline of both knees shows the bony structures are intact , no fractures or dislocations. bilateral cemented PS TKAs

## 2021-03-04 ENCOUNTER — TRANSCRIPTION ENCOUNTER (OUTPATIENT)
Age: 86
End: 2021-03-04

## 2021-04-05 ENCOUNTER — APPOINTMENT (OUTPATIENT)
Dept: CARDIOLOGY | Facility: CLINIC | Age: 86
End: 2021-04-05
Payer: MEDICARE

## 2021-04-05 VITALS
TEMPERATURE: 97.3 F | BODY MASS INDEX: 31.4 KG/M2 | HEART RATE: 79 BPM | HEIGHT: 69 IN | DIASTOLIC BLOOD PRESSURE: 80 MMHG | WEIGHT: 212 LBS | SYSTOLIC BLOOD PRESSURE: 130 MMHG

## 2021-04-05 PROCEDURE — 99213 OFFICE O/P EST LOW 20 MIN: CPT

## 2021-04-05 PROCEDURE — 93000 ELECTROCARDIOGRAM COMPLETE: CPT

## 2021-04-05 RX ORDER — PREDNISONE 50 MG/1
50 TABLET ORAL
Qty: 3 | Refills: 0 | Status: DISCONTINUED | COMMUNITY
Start: 2020-10-15 | End: 2021-04-05

## 2021-04-05 NOTE — PHYSICAL EXAM
[General Appearance - Well Developed] : well developed [Normal Appearance] : normal appearance [General Appearance - Well Nourished] : well nourished [Well Groomed] : well groomed [No Deformities] : no deformities [General Appearance - In No Acute Distress] : no acute distress [Normal Conjunctiva] : the conjunctiva exhibited no abnormalities [Eyelids - No Xanthelasma] : the eyelids demonstrated no xanthelasmas [Normal Oral Mucosa] : normal oral mucosa [No Oral Pallor] : no oral pallor [No Oral Cyanosis] : no oral cyanosis [] : no respiratory distress [Auscultation Breath Sounds / Voice Sounds] : lungs were clear to auscultation bilaterally [Heart Rate And Rhythm] : heart rate and rhythm were normal [Heart Sounds] : normal S1 and S2 [Murmurs] : no murmurs present [Arterial Pulses Normal] : the arterial pulses were normal [Edema] : no peripheral edema present [Bowel Sounds] : normal bowel sounds [Abdomen Mass (___ Cm)] : no abdominal mass palpated [Abdomen Tenderness] : non-tender [Nail Clubbing] : no clubbing of the fingernails [Cyanosis, Localized] : no localized cyanosis [Oriented To Time, Place, And Person] : oriented to person, place, and time [FreeTextEntry1] : No JVD

## 2021-04-05 NOTE — ASSESSMENT
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 \par vertigo epiosode \par  EF not clear better on [facility] [place]  and xray suggesting volume overload tx with lasix since there are dscordant result iwould repeat all test echo muga p thall not clear since some records past suggested MR \par also  reccurrent colon ca off keytruda for now and appparently stable   had miga  53% s/p hosp 2017 with some CHF , equivocal NSTEMI EF by Echo 35% had cath diagnol disease 2017 other coroanries stable appears more DCM repeat MUGA 53% again mild dilated LV saw partner for elevated BP feels well now recent PET (-) LDL 66\par \par repeat echo 10/2020 EF appeared to be 40% 1-2 MR Class I

## 2021-06-16 ENCOUNTER — APPOINTMENT (OUTPATIENT)
Dept: OTOLARYNGOLOGY | Facility: CLINIC | Age: 86
End: 2021-06-16
Payer: MEDICARE

## 2021-06-16 DIAGNOSIS — R05 COUGH: ICD-10-CM

## 2021-06-16 PROCEDURE — 31575 DIAGNOSTIC LARYNGOSCOPY: CPT

## 2021-06-16 PROCEDURE — 99204 OFFICE O/P NEW MOD 45 MIN: CPT | Mod: 25

## 2021-06-16 RX ORDER — FAMOTIDINE 40 MG/1
40 TABLET, FILM COATED ORAL
Qty: 30 | Refills: 3 | Status: ACTIVE | COMMUNITY
Start: 2021-06-16 | End: 1900-01-01

## 2021-06-16 NOTE — PROCEDURE
[Flexible Endoscope] : examined with the flexible endoscope [Epiglottis] : omega shaped bilaterally [True Vocal Cords Erythematous] : bilateral true vocal cord edema [Glottis Arytenoid Cartilages Erythema] : bilateral arytenoid ~M erythema [Normal] : posterior cricoid area had healthy pink mucosa in the interarytenoid area and the esophageal inlet

## 2021-06-16 NOTE — HISTORY OF PRESENT ILLNESS
[de-identified] : Patient presents today c/o cough .  Started couple of months . Has a cough , dry denies any phlegm.  has some discomfort when swallowing  hx of reflux on omeprazole for many years. not on allergy medications or nasal sprays. denies pain or horse ness. denies asthma. \par followed by dr karon TOLENTINO, recently had endoscppy for c/o dysphagia. found chronic gastritis, hernia, biopsy taken waiting for pathology report. Endoscopy on 5/13/21.

## 2021-07-21 ENCOUNTER — APPOINTMENT (OUTPATIENT)
Dept: OTOLARYNGOLOGY | Facility: CLINIC | Age: 86
End: 2021-07-21
Payer: MEDICARE

## 2021-07-21 DIAGNOSIS — R13.10 DYSPHAGIA, UNSPECIFIED: ICD-10-CM

## 2021-07-21 DIAGNOSIS — K22.2 ESOPHAGEAL OBSTRUCTION: ICD-10-CM

## 2021-07-21 DIAGNOSIS — K21.9 GASTRO-ESOPHAGEAL REFLUX DISEASE W/OUT ESOPHAGITIS: ICD-10-CM

## 2021-07-21 PROCEDURE — 31575 DIAGNOSTIC LARYNGOSCOPY: CPT

## 2021-07-21 PROCEDURE — 99213 OFFICE O/P EST LOW 20 MIN: CPT | Mod: 25

## 2021-07-21 NOTE — HISTORY OF PRESENT ILLNESS
[de-identified] : Patient returns today following up on chronic coughing , dysphagia . Has been taking famotidine and pantoprazole , no improvement.  Pt has intermittent coughing and its intermittent and random. Pt has stability in swallowing.

## 2021-07-21 NOTE — REASON FOR VISIT
[Subsequent Evaluation] : a subsequent evaluation for [FreeTextEntry2] : chronic coughing , dysphagia

## 2021-09-14 ENCOUNTER — APPOINTMENT (OUTPATIENT)
Dept: UROLOGY | Facility: CLINIC | Age: 86
End: 2021-09-14

## 2021-10-08 ENCOUNTER — RX RENEWAL (OUTPATIENT)
Age: 86
End: 2021-10-08

## 2021-10-11 ENCOUNTER — RX RENEWAL (OUTPATIENT)
Age: 86
End: 2021-10-11

## 2021-10-11 RX ORDER — PANTOPRAZOLE 40 MG/1
40 TABLET, DELAYED RELEASE ORAL
Qty: 30 | Refills: 3 | Status: ACTIVE | COMMUNITY
Start: 2021-06-16 | End: 1900-01-01

## 2021-10-18 ENCOUNTER — APPOINTMENT (OUTPATIENT)
Dept: CARDIOLOGY | Facility: CLINIC | Age: 86
End: 2021-10-18
Payer: MEDICARE

## 2021-10-18 ENCOUNTER — RESULT CHARGE (OUTPATIENT)
Age: 86
End: 2021-10-18

## 2021-10-18 VITALS
TEMPERATURE: 97.5 F | HEIGHT: 69 IN | DIASTOLIC BLOOD PRESSURE: 78 MMHG | WEIGHT: 204 LBS | SYSTOLIC BLOOD PRESSURE: 124 MMHG | BODY MASS INDEX: 30.21 KG/M2

## 2021-10-18 DIAGNOSIS — I34.0 NONRHEUMATIC MITRAL (VALVE) INSUFFICIENCY: ICD-10-CM

## 2021-10-18 DIAGNOSIS — I25.10 ATHEROSCLEROTIC HEART DISEASE OF NATIVE CORONARY ARTERY W/OUT ANGINA PECTORIS: ICD-10-CM

## 2021-10-18 DIAGNOSIS — I10 ESSENTIAL (PRIMARY) HYPERTENSION: ICD-10-CM

## 2021-10-18 PROCEDURE — 93000 ELECTROCARDIOGRAM COMPLETE: CPT

## 2021-10-18 PROCEDURE — 99213 OFFICE O/P EST LOW 20 MIN: CPT

## 2021-10-18 RX ORDER — OFLOXACIN 3 MG/ML
0.3 SOLUTION/ DROPS OPHTHALMIC
Qty: 10 | Refills: 0 | Status: DISCONTINUED | COMMUNITY
Start: 2021-01-11 | End: 2021-10-18

## 2021-10-18 RX ORDER — LIDOCAINE AND PRILOCAINE 25; 25 MG/G; MG/G
2.5-2.5 CREAM TOPICAL
Qty: 30 | Refills: 0 | Status: DISCONTINUED | COMMUNITY
Start: 2020-08-19 | End: 2021-10-18

## 2021-10-18 RX ORDER — KETOCONAZOLE 20 MG/G
2 CREAM TOPICAL
Qty: 60 | Refills: 0 | Status: DISCONTINUED | COMMUNITY
Start: 2020-01-10 | End: 2021-10-18

## 2021-10-18 RX ORDER — SACUBITRIL AND VALSARTAN 49; 51 MG/1; MG/1
49-51 TABLET, FILM COATED ORAL TWICE DAILY
Refills: 0 | Status: ACTIVE | COMMUNITY

## 2021-10-18 RX ORDER — FAMOTIDINE 40 MG/1
40 TABLET, FILM COATED ORAL DAILY
Refills: 0 | Status: DISCONTINUED | COMMUNITY
End: 2021-10-18

## 2021-10-18 RX ORDER — ASPIRIN 81 MG
81 TABLET, DELAYED RELEASE (ENTERIC COATED) ORAL DAILY
Refills: 0 | Status: ACTIVE | COMMUNITY

## 2021-10-18 RX ORDER — AMOXICILLIN 500 MG/1
500 CAPSULE ORAL
Qty: 20 | Refills: 0 | Status: DISCONTINUED | COMMUNITY
Start: 2021-03-04 | End: 2021-10-18

## 2021-10-18 NOTE — ASSESSMENT
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 \par vertigo epiosode \par  EF not clear better on [facility] [place]  and xray suggesting volume overload tx with lasix since there are dscordant result iwould repeat all test echo muga p thall not clear since some records past suggested MR \par also  reccurrent colon ca off keytruda for now and appparently stable   had miga  53% s/p hosp 2017 with some CHF , equivocal NSTEMI EF by Echo 35% had cath diagnol disease 2017 other coronaries stable appears more DCM repeat MUGA 53% again mild dilated LV saw partner for elevated BP feels well now recent PET (-) LDL 66\par \par repeat echo 10/2020 EF appeared to be 40% 1-2 MR Class I \par repaat echo if change in symptoms

## 2021-10-18 NOTE — REASON FOR VISIT
[Follow-Up - Clinic] : a clinic follow-up of [Abnormal ECG] : an abnormal ECG [Cardiomyopathy] : cardiomyopathy [Heart Failure] : congestive heart failure [Coronary Artery Disease] : coronary artery disease [Mitral Regurgitation] : mitral regurgitation

## 2021-11-03 ENCOUNTER — TRANSCRIPTION ENCOUNTER (OUTPATIENT)
Age: 86
End: 2021-11-03

## 2021-11-28 ENCOUNTER — RX RENEWAL (OUTPATIENT)
Age: 86
End: 2021-11-28

## 2021-11-28 RX ORDER — FUROSEMIDE 20 MG/1
20 TABLET ORAL DAILY
Qty: 90 | Refills: 3 | Status: ACTIVE | COMMUNITY
Start: 2021-11-28 | End: 1900-01-01

## 2022-04-06 ENCOUNTER — APPOINTMENT (OUTPATIENT)
Dept: CARDIOLOGY | Facility: CLINIC | Age: 87
End: 2022-04-06

## 2022-06-27 ENCOUNTER — APPOINTMENT (OUTPATIENT)
Dept: ORTHOPEDIC SURGERY | Facility: CLINIC | Age: 87
End: 2022-06-27

## 2022-08-18 DIAGNOSIS — M75.52 BURSITIS OF LEFT SHOULDER: ICD-10-CM

## 2022-09-27 ENCOUNTER — NON-APPOINTMENT (OUTPATIENT)
Age: 87
End: 2022-09-27

## 2022-09-29 ENCOUNTER — NON-APPOINTMENT (OUTPATIENT)
Age: 87
End: 2022-09-29

## 2022-10-01 ENCOUNTER — NON-APPOINTMENT (OUTPATIENT)
Age: 87
End: 2022-10-01

## 2022-12-05 ENCOUNTER — RX RENEWAL (OUTPATIENT)
Age: 87
End: 2022-12-05

## 2022-12-05 RX ORDER — METOPROLOL TARTRATE 25 MG/1
25 TABLET, FILM COATED ORAL TWICE DAILY
Qty: 180 | Refills: 3 | Status: ACTIVE | COMMUNITY
Start: 2020-09-23 | End: 1900-01-01

## 2023-02-17 ENCOUNTER — NON-APPOINTMENT (OUTPATIENT)
Age: 88
End: 2023-02-17

## 2023-07-05 ENCOUNTER — APPOINTMENT (OUTPATIENT)
Dept: ORTHOPEDIC SURGERY | Facility: CLINIC | Age: 88
End: 2023-07-05
Payer: MEDICARE

## 2023-07-05 PROCEDURE — 99213 OFFICE O/P EST LOW 20 MIN: CPT

## 2023-07-05 PROCEDURE — 72110 X-RAY EXAM L-2 SPINE 4/>VWS: CPT

## 2023-07-05 NOTE — IMAGING
[de-identified] :  pleasant easy to examine in no distress neck good motion \par \par Both shoulders some crepitus and weakness generally clinically located \par \par X-rays reviewed show mild arthritic change\par \par Lumbar spine spasm some pain traveling into both buttocks negative straight leg bilaterally reflexes depressed but symmetric\par \par X-rays lumbar spine moderate multilevel degenerative disc disease with an element of scoliosis

## 2023-07-05 NOTE — HISTORY OF PRESENT ILLNESS
[de-identified] : History of Present Illness\par Mr. Jerson Nicholson, a 89-year-old male, presents today for evaluation of pain to the left shoulder, patient states that\par the pain is mainly with activities.\par He denies any trauma or any injury.\par Patient is having difficulty with overhead activities.\par Patient states that he had similar pain on his right shoulder several years ago and he was treated with a cortisone\par injection, patient is interested in a cortisone injection to the left shoulder.\par

## 2023-07-05 NOTE — REASON FOR VISIT
[FreeTextEntry2] : Patient is coming in today for bilateral shoulders, bilateral legs. Lost his wife in February\par  last office visit 03/30/2022\par  has had x-rays of both shoulders left 03/30/2022 right 09/22/2021 lately has been having some pain radiating down both legs does have a history of longstanding arthritis he is not sure how much the injection in left shoulder helped weight is stable\par  no drug allergies weight is stable\par

## 2023-07-05 NOTE — ASSESSMENT
[FreeTextEntry1] :  recommended some physical therapy before considering any injections no reason for any MRIs he can take Advil heat is a good modality I will see him back in a few months

## 2023-10-23 ENCOUNTER — APPOINTMENT (OUTPATIENT)
Dept: ORTHOPEDIC SURGERY | Facility: CLINIC | Age: 88
End: 2023-10-23
Payer: MEDICARE

## 2023-10-23 DIAGNOSIS — M51.9 UNSPECIFIED THORACIC, THORACOLUMBAR AND LUMBOSACRAL INTERVERTEBRAL DISC DISORDER: ICD-10-CM

## 2023-10-23 DIAGNOSIS — M77.8 OTHER ENTHESOPATHIES, NOT ELSEWHERE CLASSIFIED: ICD-10-CM

## 2023-10-23 DIAGNOSIS — M54.16 RADICULOPATHY, LUMBAR REGION: ICD-10-CM

## 2023-10-23 PROCEDURE — 99213 OFFICE O/P EST LOW 20 MIN: CPT

## 2024-04-08 ENCOUNTER — APPOINTMENT (OUTPATIENT)
Dept: ORTHOPEDIC SURGERY | Facility: CLINIC | Age: 89
End: 2024-04-08

## 2024-07-23 ENCOUNTER — APPOINTMENT (OUTPATIENT)
Dept: UROLOGY | Facility: CLINIC | Age: 89
End: 2024-07-23
Payer: MEDICARE

## 2024-07-23 VITALS
HEART RATE: 54 BPM | WEIGHT: 200 LBS | DIASTOLIC BLOOD PRESSURE: 67 MMHG | SYSTOLIC BLOOD PRESSURE: 102 MMHG | OXYGEN SATURATION: 96 % | HEIGHT: 69 IN | BODY MASS INDEX: 29.62 KG/M2

## 2024-07-23 DIAGNOSIS — Z78.9 OTHER SPECIFIED HEALTH STATUS: ICD-10-CM

## 2024-07-23 DIAGNOSIS — R35.0 FREQUENCY OF MICTURITION: ICD-10-CM

## 2024-07-23 DIAGNOSIS — C61 MALIGNANT NEOPLASM OF PROSTATE: ICD-10-CM

## 2024-07-23 LAB
BILIRUB UR QL STRIP: NORMAL
COLLECTION METHOD: NORMAL
GLUCOSE UR-MCNC: NORMAL
HCG UR QL: 0.2 EU/DL
HGB UR QL STRIP.AUTO: NORMAL
KETONES UR-MCNC: NORMAL
LEUKOCYTE ESTERASE UR QL STRIP: NORMAL
NITRITE UR QL STRIP: NORMAL
PH UR STRIP: 5.5
PROT UR STRIP-MCNC: NORMAL
SP GR UR STRIP: 1.02

## 2024-07-23 PROCEDURE — 99204 OFFICE O/P NEW MOD 45 MIN: CPT

## 2024-07-23 RX ORDER — TAMSULOSIN HYDROCHLORIDE 0.4 MG/1
0.4 CAPSULE ORAL
Qty: 90 | Refills: 3 | Status: ACTIVE | COMMUNITY
Start: 2024-07-23 | End: 1900-01-01

## 2024-07-23 NOTE — HISTORY OF PRESENT ILLNESS
[FreeTextEntry1] : 89-year-old with persistent urinary frequency.  He has urgency also.  History of incidental prostate cancer found during suprapubic prostatectomy.  PSA in 2020 was 1.2.

## 2024-07-23 NOTE — ASSESSMENT
[FreeTextEntry1] : Bothersome lower urinary tract symptoms.  Will try tamsulosin 0.4 daily.  Return to office 3 months to reassess.  Incidental low risk prostate cancer.  No symptom or sign of progression.

## 2024-10-25 ENCOUNTER — APPOINTMENT (OUTPATIENT)
Dept: UROLOGY | Facility: CLINIC | Age: 89
End: 2024-10-25
Payer: MEDICARE

## 2024-10-25 DIAGNOSIS — R35.0 FREQUENCY OF MICTURITION: ICD-10-CM

## 2024-10-25 DIAGNOSIS — C61 MALIGNANT NEOPLASM OF PROSTATE: ICD-10-CM

## 2024-10-25 DIAGNOSIS — R39.15 URGENCY OF URINATION: ICD-10-CM

## 2024-10-25 LAB
BILIRUB UR QL STRIP: NORMAL
COLLECTION METHOD: NORMAL
GLUCOSE UR-MCNC: NORMAL
HCG UR QL: 0.2 EU/DL
HGB UR QL STRIP.AUTO: NORMAL
KETONES UR-MCNC: NORMAL
LEUKOCYTE ESTERASE UR QL STRIP: NORMAL
NITRITE UR QL STRIP: NORMAL
PH UR STRIP: 5.5
PROT UR STRIP-MCNC: 30
SP GR UR STRIP: 1.01

## 2024-10-25 PROCEDURE — 99213 OFFICE O/P EST LOW 20 MIN: CPT

## 2024-12-26 ENCOUNTER — NON-APPOINTMENT (OUTPATIENT)
Age: 88
End: 2024-12-26

## 2025-01-29 ENCOUNTER — INPATIENT (INPATIENT)
Facility: HOSPITAL | Age: 89
LOS: 1 days | Discharge: HOME HEALTH | DRG: 293 | End: 2025-01-31
Attending: STUDENT IN AN ORGANIZED HEALTH CARE EDUCATION/TRAINING PROGRAM | Admitting: STUDENT IN AN ORGANIZED HEALTH CARE EDUCATION/TRAINING PROGRAM
Payer: MEDICARE

## 2025-01-29 VITALS
RESPIRATION RATE: 22 BRPM | WEIGHT: 190.04 LBS | SYSTOLIC BLOOD PRESSURE: 169 MMHG | OXYGEN SATURATION: 95 % | HEIGHT: 66 IN | TEMPERATURE: 98 F | DIASTOLIC BLOOD PRESSURE: 96 MMHG | HEART RATE: 101 BPM

## 2025-01-29 DIAGNOSIS — I50.9 HEART FAILURE, UNSPECIFIED: ICD-10-CM

## 2025-01-29 LAB
ALBUMIN SERPL ELPH-MCNC: 3.9 G/DL — SIGNIFICANT CHANGE UP (ref 3.5–5.2)
ALP SERPL-CCNC: 81 U/L — SIGNIFICANT CHANGE UP (ref 30–115)
ALT FLD-CCNC: 11 U/L — SIGNIFICANT CHANGE UP (ref 0–41)
ANION GAP SERPL CALC-SCNC: 10 MMOL/L — SIGNIFICANT CHANGE UP (ref 7–14)
AST SERPL-CCNC: 13 U/L — SIGNIFICANT CHANGE UP (ref 0–41)
BASE EXCESS BLDV CALC-SCNC: -5.9 MMOL/L — LOW (ref -2–3)
BASOPHILS # BLD AUTO: 0.06 K/UL — SIGNIFICANT CHANGE UP (ref 0–0.2)
BASOPHILS NFR BLD AUTO: 0.9 % — SIGNIFICANT CHANGE UP (ref 0–1)
BILIRUB SERPL-MCNC: 0.4 MG/DL — SIGNIFICANT CHANGE UP (ref 0.2–1.2)
BUN SERPL-MCNC: 32 MG/DL — HIGH (ref 10–20)
CA-I SERPL-SCNC: 1.23 MMOL/L — SIGNIFICANT CHANGE UP (ref 1.15–1.33)
CALCIUM SERPL-MCNC: 8.2 MG/DL — LOW (ref 8.4–10.5)
CHLORIDE SERPL-SCNC: 113 MMOL/L — HIGH (ref 98–110)
CO2 SERPL-SCNC: 19 MMOL/L — SIGNIFICANT CHANGE UP (ref 17–32)
CREAT SERPL-MCNC: 1.8 MG/DL — HIGH (ref 0.7–1.5)
EGFR: 35 ML/MIN/1.73M2 — LOW
EOSINOPHIL # BLD AUTO: 0.43 K/UL — SIGNIFICANT CHANGE UP (ref 0–0.7)
EOSINOPHIL NFR BLD AUTO: 6.7 % — SIGNIFICANT CHANGE UP (ref 0–8)
GAS PNL BLDV: 143 MMOL/L — SIGNIFICANT CHANGE UP (ref 136–145)
GAS PNL BLDV: SIGNIFICANT CHANGE UP
GLUCOSE SERPL-MCNC: 114 MG/DL — HIGH (ref 70–99)
HCO3 BLDV-SCNC: 20 MMOL/L — LOW (ref 22–29)
HCT VFR BLD CALC: 32.2 % — LOW (ref 42–52)
HCT VFR BLDA CALC: 15 % — CRITICAL LOW (ref 39–51)
HGB BLD CALC-MCNC: 4.9 G/DL — CRITICAL LOW (ref 12.6–17.4)
HGB BLD-MCNC: 10.1 G/DL — LOW (ref 14–18)
IMM GRANULOCYTES NFR BLD AUTO: 0.2 % — SIGNIFICANT CHANGE UP (ref 0.1–0.3)
LACTATE BLDV-MCNC: 1.2 MMOL/L — SIGNIFICANT CHANGE UP (ref 0.5–2)
LYMPHOCYTES # BLD AUTO: 1.26 K/UL — SIGNIFICANT CHANGE UP (ref 1.2–3.4)
LYMPHOCYTES # BLD AUTO: 19.6 % — LOW (ref 20.5–51.1)
MCHC RBC-ENTMCNC: 29.6 PG — SIGNIFICANT CHANGE UP (ref 27–31)
MCHC RBC-ENTMCNC: 31.4 G/DL — LOW (ref 32–37)
MCV RBC AUTO: 94.4 FL — HIGH (ref 80–94)
MONOCYTES # BLD AUTO: 0.62 K/UL — HIGH (ref 0.1–0.6)
MONOCYTES NFR BLD AUTO: 9.7 % — HIGH (ref 1.7–9.3)
NEUTROPHILS # BLD AUTO: 4.04 K/UL — SIGNIFICANT CHANGE UP (ref 1.4–6.5)
NEUTROPHILS NFR BLD AUTO: 62.9 % — SIGNIFICANT CHANGE UP (ref 42.2–75.2)
NRBC # BLD: 0 /100 WBCS — SIGNIFICANT CHANGE UP (ref 0–0)
NRBC BLD-RTO: 0 /100 WBCS — SIGNIFICANT CHANGE UP (ref 0–0)
NT-PROBNP SERPL-SCNC: HIGH PG/ML (ref 0–300)
PCO2 BLDV: 38 MMHG — LOW (ref 42–55)
PH BLDV: 7.32 — SIGNIFICANT CHANGE UP (ref 7.32–7.43)
PLATELET # BLD AUTO: 185 K/UL — SIGNIFICANT CHANGE UP (ref 130–400)
PMV BLD: 10.8 FL — HIGH (ref 7.4–10.4)
PO2 BLDV: 28 MMHG — SIGNIFICANT CHANGE UP (ref 25–45)
POTASSIUM BLDV-SCNC: 4 MMOL/L — SIGNIFICANT CHANGE UP (ref 3.5–5.1)
POTASSIUM SERPL-MCNC: 4.1 MMOL/L — SIGNIFICANT CHANGE UP (ref 3.5–5)
POTASSIUM SERPL-SCNC: 4.1 MMOL/L — SIGNIFICANT CHANGE UP (ref 3.5–5)
PROT SERPL-MCNC: 6 G/DL — SIGNIFICANT CHANGE UP (ref 6–8)
RBC # BLD: 3.41 M/UL — LOW (ref 4.7–6.1)
RBC # FLD: 14.6 % — HIGH (ref 11.5–14.5)
SAO2 % BLDV: 51.2 % — LOW (ref 67–88)
SODIUM SERPL-SCNC: 142 MMOL/L — SIGNIFICANT CHANGE UP (ref 135–146)
TROPONIN T, HIGH SENSITIVITY RESULT: 149 NG/L — CRITICAL HIGH (ref 6–21)
WBC # BLD: 6.42 K/UL — SIGNIFICANT CHANGE UP (ref 4.8–10.8)
WBC # FLD AUTO: 6.42 K/UL — SIGNIFICANT CHANGE UP (ref 4.8–10.8)

## 2025-01-29 PROCEDURE — 83735 ASSAY OF MAGNESIUM: CPT

## 2025-01-29 PROCEDURE — 85025 COMPLETE CBC W/AUTO DIFF WBC: CPT

## 2025-01-29 PROCEDURE — 36415 COLL VENOUS BLD VENIPUNCTURE: CPT

## 2025-01-29 PROCEDURE — 71045 X-RAY EXAM CHEST 1 VIEW: CPT | Mod: 26

## 2025-01-29 PROCEDURE — 71045 X-RAY EXAM CHEST 1 VIEW: CPT

## 2025-01-29 PROCEDURE — 99285 EMERGENCY DEPT VISIT HI MDM: CPT | Mod: FS

## 2025-01-29 PROCEDURE — 80048 BASIC METABOLIC PNL TOTAL CA: CPT

## 2025-01-29 PROCEDURE — 84484 ASSAY OF TROPONIN QUANT: CPT

## 2025-01-29 PROCEDURE — 80053 COMPREHEN METABOLIC PANEL: CPT

## 2025-01-29 PROCEDURE — 84100 ASSAY OF PHOSPHORUS: CPT

## 2025-01-29 RX ORDER — QUETIAPINE FUMARATE 300 MG/1
25 TABLET ORAL ONCE
Refills: 0 | Status: COMPLETED | OUTPATIENT
Start: 2025-01-29 | End: 2025-01-29

## 2025-01-29 NOTE — ED PROVIDER NOTE - NS ED MD TWO NIGHTS YN
UGI wo KUB

 

HISTORY: eval R -Y GBP 

 

FINDINGS: Limited upper GI series was obtained without fluoroscopy. Water-soluble contrast was admini
stered orally. Immediate along with 15 minute delayed images were obtained. Small gastric pouch is de
monstrated. Contrast passes readily through the gastrojejunostomy into loops of jejunum. No obstructi
on is identified. There is no contrast extravasation. Surgical drain is noted left upper quadrant.

 

IMPRESSION: No postoperative complication identified status post Jose Maria-en-Y gastric bypass. Yes

## 2025-01-29 NOTE — ED PROVIDER NOTE - ATTENDING APP SHARED VISIT CONTRIBUTION OF CARE
pt w hx of chf sent in for fluid overload. +sob, no cp. no fever.   he is comfortable in nad, b/l rales. rrr, ab soft nt. b/l ankle edema. nfd.

## 2025-01-29 NOTE — ED PROVIDER NOTE - OBJECTIVE STATEMENT
89 year old male, past medical history htn, hdl, chf, ckd, who presents with dyspnea on exertion. patient with worsening dyspnea on exertion and le swelling that began x3 days ago. patient recently started on lasix every other day increased to daily x3 days ago. denies f/c, chest pain, hemoptysis, uri symptoms, nausea/vomiting. patient follows with cardiologist, dr grimes and dr kiya galindo.

## 2025-01-29 NOTE — ED PROVIDER NOTE - PHYSICAL EXAMINATION
CONSTITUTIONAL: elderly male, no acute distress  SKIN: skin exam is warm and dry  HEAD: Normocephalic; atraumatic  EYES: PERRL, conjunctiva and sclera clear.  ENT: MMM  CARD: S1, S2 normal, no murmur  RESP: bibasilar rhonchi and crackles, speaking full sentences, no accessory muscle use   ABD: soft; non-distended; non-tender  EXT: 1+ pitting edema bilaterally, extending to shins, no calf tenderness  NEURO: awake, alert, following commands, oriented, grossly unremarkable. No Focal deficits. GCS 15.   PSYCH: Cooperative

## 2025-01-29 NOTE — ED PROVIDER NOTE - CARE PLAN
1 Principal Discharge DX:	CHF exacerbation   Principal Discharge DX:	CHF exacerbation  Assessment and plan of treatment:	fluid overload/chf exac.  labs, imaging, supportive care and admit for diuresis

## 2025-01-29 NOTE — ED ADULT TRIAGE NOTE - HEIGHT IN CM
167.64 Abdomen , soft, nontender, nondistended , no guarding or rigidity , no masses palpable , normal bowel sounds , Liver and Spleen , no hepatomegaly present , no hepatosplenomegaly , liver nontender , spleen not palpable

## 2025-01-29 NOTE — ED ADULT TRIAGE NOTE - CHIEF COMPLAINT QUOTE
He's been getting SOB, has a cough and some wheezing. Dr Miller wants him here to get IV Lasix, he thinks it might be his CHF - daughter

## 2025-01-30 LAB
ANION GAP SERPL CALC-SCNC: 11 MMOL/L — SIGNIFICANT CHANGE UP (ref 7–14)
BASOPHILS # BLD AUTO: 0.06 K/UL — SIGNIFICANT CHANGE UP (ref 0–0.2)
BASOPHILS NFR BLD AUTO: 1.1 % — HIGH (ref 0–1)
BUN SERPL-MCNC: 31 MG/DL — HIGH (ref 10–20)
CALCIUM SERPL-MCNC: 8.3 MG/DL — LOW (ref 8.4–10.4)
CHLORIDE SERPL-SCNC: 117 MMOL/L — HIGH (ref 98–110)
CO2 SERPL-SCNC: 19 MMOL/L — SIGNIFICANT CHANGE UP (ref 17–32)
CREAT SERPL-MCNC: 1.8 MG/DL — HIGH (ref 0.7–1.5)
EGFR: 35 ML/MIN/1.73M2 — LOW
EOSINOPHIL # BLD AUTO: 0.38 K/UL — SIGNIFICANT CHANGE UP (ref 0–0.7)
EOSINOPHIL NFR BLD AUTO: 7 % — SIGNIFICANT CHANGE UP (ref 0–8)
GLUCOSE SERPL-MCNC: 82 MG/DL — SIGNIFICANT CHANGE UP (ref 70–99)
HCT VFR BLD CALC: 30.5 % — LOW (ref 42–52)
HGB BLD-MCNC: 9.5 G/DL — LOW (ref 14–18)
IMM GRANULOCYTES NFR BLD AUTO: 0.2 % — SIGNIFICANT CHANGE UP (ref 0.1–0.3)
LYMPHOCYTES # BLD AUTO: 0.95 K/UL — LOW (ref 1.2–3.4)
LYMPHOCYTES # BLD AUTO: 17.4 % — LOW (ref 20.5–51.1)
MAGNESIUM SERPL-MCNC: 1.4 MG/DL — LOW (ref 1.8–2.4)
MCHC RBC-ENTMCNC: 29.2 PG — SIGNIFICANT CHANGE UP (ref 27–31)
MCHC RBC-ENTMCNC: 31.1 G/DL — LOW (ref 32–37)
MCV RBC AUTO: 93.8 FL — SIGNIFICANT CHANGE UP (ref 80–94)
MONOCYTES # BLD AUTO: 0.71 K/UL — HIGH (ref 0.1–0.6)
MONOCYTES NFR BLD AUTO: 13 % — HIGH (ref 1.7–9.3)
NEUTROPHILS # BLD AUTO: 3.35 K/UL — SIGNIFICANT CHANGE UP (ref 1.4–6.5)
NEUTROPHILS NFR BLD AUTO: 61.3 % — SIGNIFICANT CHANGE UP (ref 42.2–75.2)
NRBC # BLD: 0 /100 WBCS — SIGNIFICANT CHANGE UP (ref 0–0)
NRBC BLD-RTO: 0 /100 WBCS — SIGNIFICANT CHANGE UP (ref 0–0)
PHOSPHATE SERPL-MCNC: 2.7 MG/DL — SIGNIFICANT CHANGE UP (ref 2.1–4.9)
PLATELET # BLD AUTO: 176 K/UL — SIGNIFICANT CHANGE UP (ref 130–400)
PMV BLD: 11.2 FL — HIGH (ref 7.4–10.4)
POTASSIUM SERPL-MCNC: 3.7 MMOL/L — SIGNIFICANT CHANGE UP (ref 3.5–5)
POTASSIUM SERPL-SCNC: 3.7 MMOL/L — SIGNIFICANT CHANGE UP (ref 3.5–5)
RBC # BLD: 3.25 M/UL — LOW (ref 4.7–6.1)
RBC # FLD: 14.5 % — SIGNIFICANT CHANGE UP (ref 11.5–14.5)
SODIUM SERPL-SCNC: 147 MMOL/L — HIGH (ref 135–146)
TROPONIN T, HIGH SENSITIVITY RESULT: 156 NG/L — CRITICAL HIGH (ref 6–21)
WBC # BLD: 5.46 K/UL — SIGNIFICANT CHANGE UP (ref 4.8–10.8)
WBC # FLD AUTO: 5.46 K/UL — SIGNIFICANT CHANGE UP (ref 4.8–10.8)

## 2025-01-30 PROCEDURE — 99223 1ST HOSP IP/OBS HIGH 75: CPT

## 2025-01-30 RX ORDER — SUCRALFATE 1 G/10ML
1 SUSPENSION ORAL EVERY 6 HOURS
Refills: 0 | Status: DISCONTINUED | OUTPATIENT
Start: 2025-01-30 | End: 2025-01-31

## 2025-01-30 RX ORDER — TAMSULOSIN HYDROCHLORIDE 0.4 MG/1
1 CAPSULE ORAL
Refills: 0 | DISCHARGE

## 2025-01-30 RX ORDER — LEVOTHYROXINE SODIUM 25 UG/1
88 TABLET ORAL DAILY
Refills: 0 | Status: DISCONTINUED | OUTPATIENT
Start: 2025-01-30 | End: 2025-01-31

## 2025-01-30 RX ORDER — TAMSULOSIN HYDROCHLORIDE 0.4 MG/1
0.4 CAPSULE ORAL AT BEDTIME
Refills: 0 | Status: DISCONTINUED | OUTPATIENT
Start: 2025-01-30 | End: 2025-01-31

## 2025-01-30 RX ORDER — QUETIAPINE FUMARATE 300 MG/1
25 TABLET ORAL AT BEDTIME
Refills: 0 | Status: DISCONTINUED | OUTPATIENT
Start: 2025-01-30 | End: 2025-01-31

## 2025-01-30 RX ORDER — ASPIRIN 81 MG/1
1 TABLET, COATED ORAL
Refills: 0 | DISCHARGE

## 2025-01-30 RX ORDER — LEVOTHYROXINE SODIUM 25 UG/1
1 TABLET ORAL
Refills: 0 | DISCHARGE

## 2025-01-30 RX ORDER — QUETIAPINE FUMARATE 300 MG/1
1 TABLET ORAL
Refills: 0 | DISCHARGE

## 2025-01-30 RX ORDER — ACETAMINOPHEN 160 MG/5ML
650 SUSPENSION ORAL EVERY 6 HOURS
Refills: 0 | Status: DISCONTINUED | OUTPATIENT
Start: 2025-01-30 | End: 2025-01-31

## 2025-01-30 RX ORDER — PANTOPRAZOLE 20 MG/1
40 TABLET, DELAYED RELEASE ORAL
Refills: 0 | Status: DISCONTINUED | OUTPATIENT
Start: 2025-01-30 | End: 2025-01-31

## 2025-01-30 RX ORDER — POTASSIUM CHLORIDE 750 MG/1
40 TABLET, EXTENDED RELEASE ORAL ONCE
Refills: 0 | Status: COMPLETED | OUTPATIENT
Start: 2025-01-30 | End: 2025-01-30

## 2025-01-30 RX ORDER — SACUBITRIL AND VALSARTAN 49; 51 MG/1; MG/1
1 TABLET, FILM COATED ORAL EVERY 12 HOURS
Refills: 0 | Status: DISCONTINUED | OUTPATIENT
Start: 2025-01-30 | End: 2025-01-31

## 2025-01-30 RX ORDER — ANTISEPTIC SURGICAL SCRUB 0.04 MG/ML
1 SOLUTION TOPICAL DAILY
Refills: 0 | Status: DISCONTINUED | OUTPATIENT
Start: 2025-01-30 | End: 2025-01-31

## 2025-01-30 RX ORDER — METOPROLOL SUCCINATE 25 MG
1 TABLET, EXTENDED RELEASE 24 HR ORAL
Refills: 0 | DISCHARGE

## 2025-01-30 RX ORDER — DICYCLOMINE HCL 20 MG
1 TABLET ORAL
Refills: 0 | DISCHARGE

## 2025-01-30 RX ORDER — DICYCLOMINE HCL 20 MG
20 TABLET ORAL
Refills: 0 | Status: DISCONTINUED | OUTPATIENT
Start: 2025-01-30 | End: 2025-01-31

## 2025-01-30 RX ORDER — OMEPRAZOLE 20 MG/1
1 CAPSULE, DELAYED RELEASE ORAL
Refills: 0 | DISCHARGE

## 2025-01-30 RX ORDER — MAGNESIUM SULFATE 0.8 MEQ/ML
2 AMPUL (ML) INJECTION
Refills: 0 | Status: COMPLETED | OUTPATIENT
Start: 2025-01-30 | End: 2025-01-30

## 2025-01-30 RX ORDER — SACUBITRIL AND VALSARTAN 49; 51 MG/1; MG/1
1 TABLET, FILM COATED ORAL
Refills: 0 | DISCHARGE

## 2025-01-30 RX ORDER — HEPARIN SODIUM,PORCINE 10000/ML
5000 VIAL (ML) INJECTION EVERY 12 HOURS
Refills: 0 | Status: DISCONTINUED | OUTPATIENT
Start: 2025-01-30 | End: 2025-01-31

## 2025-01-30 RX ADMIN — Medication 5000 UNIT(S): at 19:55

## 2025-01-30 RX ADMIN — SACUBITRIL AND VALSARTAN 1 TABLET(S): 49; 51 TABLET, FILM COATED ORAL at 06:30

## 2025-01-30 RX ADMIN — SACUBITRIL AND VALSARTAN 1 TABLET(S): 49; 51 TABLET, FILM COATED ORAL at 19:56

## 2025-01-30 RX ADMIN — QUETIAPINE FUMARATE 25 MILLIGRAM(S): 300 TABLET ORAL at 00:00

## 2025-01-30 RX ADMIN — Medication 40 MILLIGRAM(S): at 06:30

## 2025-01-30 RX ADMIN — Medication 25 GRAM(S): at 10:24

## 2025-01-30 RX ADMIN — LEVOTHYROXINE SODIUM 88 MICROGRAM(S): 25 TABLET ORAL at 10:24

## 2025-01-30 RX ADMIN — PANTOPRAZOLE 40 MILLIGRAM(S): 20 TABLET, DELAYED RELEASE ORAL at 06:30

## 2025-01-30 RX ADMIN — Medication 25 GRAM(S): at 12:55

## 2025-01-30 RX ADMIN — Medication 40 MILLIGRAM(S): at 00:00

## 2025-01-30 RX ADMIN — Medication 20 MILLIGRAM(S): at 19:55

## 2025-01-30 RX ADMIN — POTASSIUM CHLORIDE 40 MILLIEQUIVALENT(S): 750 TABLET, EXTENDED RELEASE ORAL at 11:55

## 2025-01-30 RX ADMIN — ANTISEPTIC SURGICAL SCRUB 1 APPLICATION(S): 0.04 SOLUTION TOPICAL at 12:56

## 2025-01-30 RX ADMIN — Medication 5000 UNIT(S): at 06:31

## 2025-01-30 RX ADMIN — QUETIAPINE FUMARATE 25 MILLIGRAM(S): 300 TABLET ORAL at 22:26

## 2025-01-30 RX ADMIN — SUCRALFATE 1 GRAM(S): 1 SUSPENSION ORAL at 06:30

## 2025-01-30 RX ADMIN — TAMSULOSIN HYDROCHLORIDE 0.4 MILLIGRAM(S): 0.4 CAPSULE ORAL at 22:26

## 2025-01-30 RX ADMIN — Medication 40 MILLIGRAM(S): at 19:55

## 2025-01-30 NOTE — CONSULT NOTE ADULT - SUBJECTIVE AND OBJECTIVE BOX
Date of Admission: 1/29/25    CHIEF COMPLAINT: shortness of breath, non responsive to outpatient diuresis.     HISTORY OF PRESENT ILLNESS: 89yMale with PMH below presented to the hospital for above CC, Well known to me. Has had long standing HFmREF for years, late in the fall had worsening creatinine and renal function and had his spironolactone and lasix held by his PMD. Had been followed by myself, Dr. Murrell and Dr. Carroll and was managing well until 2-3 weeks ago and started to develop LE edema. urine output did not  as expected and earlier this week had shortness of breath at rest and with minimal exertion. lasix was increased to daily without much urine production. Spoke with family last night and given non response to diuresis, and concern for bowel edema and subsequent lower bioavailability of his lasix, recommended ER admission.     PAST MEDICAL & SURGICAL HISTORY:  HTN (hypertension)      HLD (hyperlipidemia)      Chronic kidney disease, unspecified CKD stage      History of chronic CHF          FAMILY HISTORY:  [ ] no pertinent family history of premature cardiovascular disease in first degree relatives.  Mother:   Father:   Siblings:     SOCIAL HISTORY:    [ ] Non-smoker  [ ] Smoker  [ ] Alcohol    Allergies    No Known Allergies    Intolerances    	    REVIEW OF SYSTEMS:  CONSTITUTIONAL: No fever, weight loss, or fatigue  CARDIOLOGY: see HPI  RESPIRATORY: see HPI.   NEUROLOGICAL: NO weakness, no focal deficits to report.  ENDOCRINOLOGICAL: no recent change in diabetic medications.   GI: no BRBPR, no N,V,diarrhea.    PSYCHIATRY: normal mood and affect  HEENT: no nasal discharge, no ecchymosis  SKIN: no ecchymosis, no breakdown  MUSCULOSKELETAL: Full range of motion x4.     PHYSICAL EXAM:  T(C): 36.4 (01-30-25 @ 07:27), Max: 36.6 (01-29-25 @ 21:01)  HR: 96 (01-30-25 @ 07:27) (86 - 101)  BP: 108/63 (01-30-25 @ 07:27) (108/63 - 169/96)  RR: 18 (01-30-25 @ 07:27) (18 - 22)  SpO2: 94% (01-30-25 @ 07:27) (94% - 100%)  Wt(kg): --  I&O's Summary      General Appearance: well appearing, normal for age and gender. 	  Neck: normal JVP, no bruit.   Eyes: No xanthomalasia, Extra Ocular muscles intact.   Cardiovascular: regular rate and rhythm S1 S2, No JVD, No murmurs,1-2+ B/L LE edema.   Respiratory: crackles and ral;es at bilateral bases	  Psychiatry: Alert and oriented x 3, Mood & affect appropriate  Gastrointestinal:  Soft, Non-tender  Skin/Integumen: No rashes, No ecchymoses, No cyanosis	  Neurologic: Non-focal  Musculoskeletal/ extremities: Normal range of motion, No clubbing, cyanosis or edema  Vascular: Peripheral pulses palpable 2+ bilaterally    LABS:	 	                          9.5    5.46  )-----------( 176      ( 30 Jan 2025 05:32 )             30.5     01-30    147[H]  |  117[H]  |  31[H]  ----------------------------<  82  3.7   |  19  |  1.8[H]    Ca    8.3[L]      30 Jan 2025 05:32  Phos  2.7     01-30  Mg     1.4     01-30    TPro  6.0  /  Alb  3.9  /  TBili  0.4  /  DBili  x   /  AST  13  /  ALT  11  /  AlkPhos  81  01-29            CARDIAC MARKERS:            TELEMETRY EVENTS: PVCs	    ECG:  < from: 12 Lead ECG (01.29.25 @ 21:09) >  Diagnosis Line Sinus tachycardia with occasional Premature ventricular complexes  Left axis deviation  Right bundle branch block  Inferior infarct , age undetermined  Abnormal ECG    < end of copied text >  	  RADIOLOGY: < from: Xray Chest 1 View- PORTABLE-Urgent (Xray Chest 1 View- PORTABLE-Urgent .) (01.29.25 @ 22:32) >  Bibasilar and interstitial opacities.    < end of copied text >    OTHER: 	    PREVIOUS DIAGNOSTIC TESTING:    [x ] Echocardiogram: last echo in my office in september of 2024, EF 45% by biplane, mild to moderate M, sclerotic aortic valve with decreased opening and mild AI, mild to moderate TR  [ ]  Catheterization:  [ ] Stress Test:  	  	    Home Medications:    MEDICATIONS  (STANDING):  chlorhexidine 2% Cloths 1 Application(s) Topical daily  furosemide   Injectable 40 milliGRAM(s) IV Push every 12 hours  heparin   Injectable 5000 Unit(s) SubCutaneous every 12 hours  levothyroxine 88 MICROGram(s) Oral daily  pantoprazole    Tablet 40 milliGRAM(s) Oral before breakfast  QUEtiapine 25 milliGRAM(s) Oral at bedtime  sacubitril 49 mG/valsartan 51 mG 1 Tablet(s) Oral every 12 hours  tamsulosin 0.4 milliGRAM(s) Oral at bedtime    MEDICATIONS  (PRN):  acetaminophen     Tablet .. 650 milliGRAM(s) Oral every 6 hours PRN Mild Pain (1 - 3)  sucralfate 1 Gram(s) Oral every 6 hours PRN Dyspepsia

## 2025-01-30 NOTE — H&P ADULT - ASSESSMENT
89 year old male, past medical history htn, hld, chf, ckd, who presents with dyspnea on exertion, mild non-prod cough, and b/l LE edema x 2-3 days, sent by cardiologist.    Please call [facility] or daughter in AM to obtain list of meds. Currently, meds have been ordered using a combination of Dr. Carroll's note in [facility] and [facility].    #SOB 2/2 acute on chronic HFrEF  - LVEF 39% in 2020 (TTE in [facility]).  - Presented with 2-3 days of progressive HENRY.  - Triage VS: /96, , RR 22, T 97.8F, SpO2 95% on RA.  - Labs: CBC with H/H 10.1/32.2, MCV 94.4, RDW 14.6, CMP with creat 1.8, HS troponin 149, pro-BNP 74047.  - ECG: sinus tach with occasional PVCs.  - CXR: bibasilar and interstitial opacities.  - ED interventions: IV furosemide 40mg. Patient reports urinating a lot since receiving it.  Plan:  - F/u TTE.  - Continue IV Lasix 40mg BID for now.  - Monitor accurate I&O, daily weight.  - Telemetry monitoring.  - 1.5L fluid restriction.  - Continue Entresto 49-51 (but needs to be verified).    #CKD  - Cr 1.8, at baseline.  - Follows Dr. Rodriguez.  - Continue sodium bicarb 650 TID.    #Hypothyroidism  - Continue levothyroxine 88mcg.    #BPH  - Continue tamsulosin 0.4mg QHS.    #DIET: DASH/TLC, 1500cc restriction  #DVTppx: SQH Q12H  #GIppx: Pantoprazole, sucralfate  #Activity: Increase as tolerated  #CODE: FULL  #Disposition: from home, likely dc home pending above. 89 year old male, past medical history htn, hld, chf, ckd, who presents with dyspnea on exertion, mild non-prod cough, and b/l LE edema x 2-3 days, sent by cardiologist.    #SOB 2/2 acute on chronic HFrEF  LVEF 39% in 2020 (TTE in [facility] [place]  ECG: sinus tach with occasional PVCs.  CXR reviewed and itnerpreted bibasilar and interstitial opacities  van Jarrett  - Continue IV Lasix 40mg BID, switch to PO lasix 40 qD tomorrow  - Monitor accurate I&O, daily weight.  - Telemetry monitoring.  - 1.5L fluid restriction.  - Continue Entresto 49-51   - Cont metoprolol tartrate 25 BID    #CKD  - Cr 1.8, at baseline.  - Follows Dr. Rodriguez.  - Continue sodium bicarb 650 TID.    #Hypothyroidism  - Continue levothyroxine 88mcg.    #BPH  - Continue tamsulosin 0.4mg QHS.    #Anxiety/Insomnia  - Cont seroquel 25mg qD    DVT PPX, Heparin    #Progress Note Handoff  Pending (specify): Cont IV lasix, adp 24h  Family discussion: van pt regarding plan of care  Disposition: Tele, from home

## 2025-01-30 NOTE — CONSULT NOTE ADULT - ASSESSMENT
Acute fluid overload secondary to combined HFMREF and chronic renal insufficiency  CAD  HTN  DLD  mild cognitive impairment    - cause is that his lasix and spironolactone have been stopped due to worsening renal function  - ok to hold off on spironolactone for now  - agree with 40 mg IV lasix BID-0 diuresed 1.5L last night  - try to give oral 40 mg lasix in AM tomorrow. If producing urine, can be managed as an outpatient from a CHF standpoint (want to limit hospital stay as patient is 89 and dont want him to get flu or RSV)  - ambulate daily  - echo not necessary as cause is known but if performed, ok by me.   - monitor Is and Os.   - has follow up with Dr. Rodriguez next week and may follow up with me in 2 weeks as an outpatient to mange these chronic issues.

## 2025-01-30 NOTE — H&P ADULT - ATTENDING COMMENTS
89 year old male, past medical history htn, hld, chf, ckd, who presents with dyspnea on exertion, mild non-prod cough, and b/l LE edema x 2-3 days. Patient recently started on lasix every other day increased to daily x3 days ago. denies f/c, chest pain, nausea/vomiting. Patient follows with cardiologist, Dr. Jarrett, who told him to come to the hospital.    #Dyspnea  #Acute on Chronic HFrEF  - LVEF 39% in 2020 (TTE in [facility]).  - Presented with 2-3 days of progressive HENRY.  - Triage VS: /96, , RR 22, T 97.8F, SpO2 95% on RA.  - Labs: CBC with H/H 10.1/32.2, MCV 94.4, RDW 14.6, CMP with creat 1.8, HS troponin 149, pro-BNP 62115.  - ECG: sinus tach with occasional PVCs.  - CXR: bibasilar and interstitial opacities.  - ED interventions: IV furosemide 40mg. Patient reports urinating a lot since receiving it.  Plan:  - F/u TTE.  - Continue IV Lasix 40mg BID for now.  - Monitor accurate I&O, daily weight.  - Telemetry monitoring.  - 1.5L fluid restriction.  - Continue Entresto 49-51 (but needs to be verified).    #CKD  - Cr 1.8, at baseline.  - Follows Dr. Rodriguez.  - Continue sodium bicarb 650 TID.    #Hypothyroidism  - Continue levothyroxine 88mcg.    #BPH  - Continue tamsulosin 0.4mg QHS.    #DIET: DASH/TLC, 1500cc restriction

## 2025-01-30 NOTE — ED ADULT NURSE REASSESSMENT NOTE - NS ED NURSE REASSESS COMMENT FT1
Pt received from previous nurse. Bed locked and in lowest position. Call bell within reach.
report taken from night rn, patient resting comfortably, VSS, call bell in reach.
no

## 2025-01-30 NOTE — H&P ADULT - HISTORY OF PRESENT ILLNESS
89 year old male, past medical history htn, hdl, chf, ckd, who presents with dyspnea on exertion. patient with worsening dyspnea on exertion and le swelling that began x3 days ago. patient recently started on lasix every other day increased to daily x3 days ago. denies f/c, chest pain, hemoptysis, uri symptoms, nausea/vomiting. patient follows with cardiologist, dr grimes.    Triage VS: /96, , RR 22, T 97.8F, SpO2 95% on RA.  Labs: CBC with H/H 10.1/32.2, MCV 94.4, RDW 14.6, CMP with creat 1.8, HS troponin 149, pro-BNP 61938.  ECG: sinus tach with occasional PVCs.  CXR: bibasilar and interstitial opacities.    ED interventions: IV furosemide 40mg, PO quetiapine 25mg,  89 year old male, past medical history htn, hld, chf, ckd, who presents with dyspnea on exertion, mild non-prod cough, and b/l LE edema x 2-3 days. Patient recently started on lasix every other day increased to daily x3 days ago. denies f/c, chest pain, nausea/vomiting. Patient follows with cardiologist, Dr. Jarrett, who told him to come to the hospital.    Triage VS: /96, , RR 22, T 97.8F, SpO2 95% on RA.  Labs: CBC with H/H 10.1/32.2, MCV 94.4, RDW 14.6, CMP with creat 1.8, HS troponin 149, pro-BNP 85350.  ECG: sinus tach with occasional PVCs.  CXR: bibasilar and interstitial opacities.    ED interventions: IV furosemide 40mg.

## 2025-01-30 NOTE — H&P ADULT - NSICDXPASTMEDICALHX_GEN_ALL_CORE_FT
PAST MEDICAL HISTORY:  Chronic kidney disease, unspecified CKD stage     History of chronic CHF     HLD (hyperlipidemia)     HTN (hypertension)

## 2025-01-31 ENCOUNTER — TRANSCRIPTION ENCOUNTER (OUTPATIENT)
Age: 89
End: 2025-01-31

## 2025-01-31 VITALS
DIASTOLIC BLOOD PRESSURE: 73 MMHG | TEMPERATURE: 98 F | SYSTOLIC BLOOD PRESSURE: 110 MMHG | RESPIRATION RATE: 18 BRPM | HEART RATE: 98 BPM | OXYGEN SATURATION: 95 %

## 2025-01-31 LAB
ALBUMIN SERPL ELPH-MCNC: 4.1 G/DL — SIGNIFICANT CHANGE UP (ref 3.5–5.2)
ALP SERPL-CCNC: 92 U/L — SIGNIFICANT CHANGE UP (ref 30–115)
ALT FLD-CCNC: 13 U/L — SIGNIFICANT CHANGE UP (ref 0–41)
ANION GAP SERPL CALC-SCNC: 13 MMOL/L — SIGNIFICANT CHANGE UP (ref 7–14)
AST SERPL-CCNC: 13 U/L — SIGNIFICANT CHANGE UP (ref 0–41)
BASOPHILS # BLD AUTO: 0.07 K/UL — SIGNIFICANT CHANGE UP (ref 0–0.2)
BASOPHILS NFR BLD AUTO: 1.1 % — HIGH (ref 0–1)
BILIRUB SERPL-MCNC: 0.4 MG/DL — SIGNIFICANT CHANGE UP (ref 0.2–1.2)
BUN SERPL-MCNC: 31 MG/DL — HIGH (ref 10–20)
CALCIUM SERPL-MCNC: 8.7 MG/DL — SIGNIFICANT CHANGE UP (ref 8.4–10.5)
CHLORIDE SERPL-SCNC: 114 MMOL/L — HIGH (ref 98–110)
CO2 SERPL-SCNC: 18 MMOL/L — SIGNIFICANT CHANGE UP (ref 17–32)
CREAT SERPL-MCNC: 2 MG/DL — HIGH (ref 0.7–1.5)
EGFR: 31 ML/MIN/1.73M2 — LOW
EOSINOPHIL # BLD AUTO: 0.44 K/UL — SIGNIFICANT CHANGE UP (ref 0–0.7)
EOSINOPHIL NFR BLD AUTO: 6.6 % — SIGNIFICANT CHANGE UP (ref 0–8)
GLUCOSE SERPL-MCNC: 112 MG/DL — HIGH (ref 70–99)
HCT VFR BLD CALC: 36.5 % — LOW (ref 42–52)
HGB BLD-MCNC: 11.4 G/DL — LOW (ref 14–18)
IMM GRANULOCYTES NFR BLD AUTO: 0.2 % — SIGNIFICANT CHANGE UP (ref 0.1–0.3)
LYMPHOCYTES # BLD AUTO: 1.6 K/UL — SIGNIFICANT CHANGE UP (ref 1.2–3.4)
LYMPHOCYTES # BLD AUTO: 24.1 % — SIGNIFICANT CHANGE UP (ref 20.5–51.1)
MCHC RBC-ENTMCNC: 29.3 PG — SIGNIFICANT CHANGE UP (ref 27–31)
MCHC RBC-ENTMCNC: 31.2 G/DL — LOW (ref 32–37)
MCV RBC AUTO: 93.8 FL — SIGNIFICANT CHANGE UP (ref 80–94)
MONOCYTES # BLD AUTO: 0.7 K/UL — HIGH (ref 0.1–0.6)
MONOCYTES NFR BLD AUTO: 10.6 % — HIGH (ref 1.7–9.3)
NEUTROPHILS # BLD AUTO: 3.81 K/UL — SIGNIFICANT CHANGE UP (ref 1.4–6.5)
NEUTROPHILS NFR BLD AUTO: 57.4 % — SIGNIFICANT CHANGE UP (ref 42.2–75.2)
NRBC # BLD: 0 /100 WBCS — SIGNIFICANT CHANGE UP (ref 0–0)
NRBC BLD-RTO: 0 /100 WBCS — SIGNIFICANT CHANGE UP (ref 0–0)
PLATELET # BLD AUTO: 210 K/UL — SIGNIFICANT CHANGE UP (ref 130–400)
PMV BLD: 11.1 FL — HIGH (ref 7.4–10.4)
POTASSIUM SERPL-MCNC: 4 MMOL/L — SIGNIFICANT CHANGE UP (ref 3.5–5)
POTASSIUM SERPL-SCNC: 4 MMOL/L — SIGNIFICANT CHANGE UP (ref 3.5–5)
PROT SERPL-MCNC: 6.4 G/DL — SIGNIFICANT CHANGE UP (ref 6–8)
RBC # BLD: 3.89 M/UL — LOW (ref 4.7–6.1)
RBC # FLD: 14.8 % — HIGH (ref 11.5–14.5)
SODIUM SERPL-SCNC: 145 MMOL/L — SIGNIFICANT CHANGE UP (ref 135–146)
WBC # BLD: 6.63 K/UL — SIGNIFICANT CHANGE UP (ref 4.8–10.8)
WBC # FLD AUTO: 6.63 K/UL — SIGNIFICANT CHANGE UP (ref 4.8–10.8)

## 2025-01-31 PROCEDURE — 71045 X-RAY EXAM CHEST 1 VIEW: CPT | Mod: 26

## 2025-01-31 PROCEDURE — 99239 HOSP IP/OBS DSCHRG MGMT >30: CPT

## 2025-01-31 RX ORDER — MAGNESIUM SULFATE 0.8 MEQ/ML
2 AMPUL (ML) INJECTION ONCE
Refills: 0 | Status: COMPLETED | OUTPATIENT
Start: 2025-01-31 | End: 2025-01-31

## 2025-01-31 RX ORDER — METOPROLOL SUCCINATE 25 MG
25 TABLET, EXTENDED RELEASE 24 HR ORAL
Refills: 0 | Status: DISCONTINUED | OUTPATIENT
Start: 2025-01-31 | End: 2025-01-31

## 2025-01-31 RX ADMIN — ANTISEPTIC SURGICAL SCRUB 1 APPLICATION(S): 0.04 SOLUTION TOPICAL at 11:01

## 2025-01-31 RX ADMIN — SACUBITRIL AND VALSARTAN 1 TABLET(S): 49; 51 TABLET, FILM COATED ORAL at 06:49

## 2025-01-31 RX ADMIN — Medication 25 MILLIGRAM(S): at 11:00

## 2025-01-31 RX ADMIN — LEVOTHYROXINE SODIUM 88 MICROGRAM(S): 25 TABLET ORAL at 06:50

## 2025-01-31 RX ADMIN — PANTOPRAZOLE 40 MILLIGRAM(S): 20 TABLET, DELAYED RELEASE ORAL at 06:49

## 2025-01-31 RX ADMIN — Medication 25 GRAM(S): at 10:08

## 2025-01-31 RX ADMIN — Medication 20 MILLIGRAM(S): at 10:55

## 2025-01-31 RX ADMIN — Medication 5000 UNIT(S): at 06:50

## 2025-01-31 RX ADMIN — Medication 20 MILLIGRAM(S): at 06:55

## 2025-01-31 RX ADMIN — Medication 40 MILLIGRAM(S): at 06:50

## 2025-01-31 NOTE — DISCHARGE NOTE PROVIDER - PROVIDER TOKENS
PROVIDER:[TOKEN:[[id number]],FOLLOWUP:[1 week],ESTABLISHEDPATIENT:[T]],PROVIDER:[TOKEN:[[id number]],FOLLOWUP:[2 weeks],ESTABLISHEDPATIENT:[T]]

## 2025-01-31 NOTE — DISCHARGE NOTE PROVIDER - CARE PROVIDERS DIRECT ADDRESSES
,DirectAddress_Unknown,[email or web address]

## 2025-01-31 NOTE — PROGRESS NOTE ADULT - SUBJECTIVE AND OBJECTIVE BOX
SUBJECTIVE/OVERNIGHT EVENTS  Today is hospital day 2d. This morning patient was seen and examined at bedside, resting comfortably in bed. No acute or major events overnight.    HOSPITAL COURSE  Day 1:   Day 2:   Day 3:     CODE STATUS:    FAMILY COMMUNICATION  Contact date:  Name of person contacted:  Relationship to patient:  Communication details:    MEDICATIONS  STANDING MEDICATIONS  chlorhexidine 2% Cloths 1 Application(s) Topical daily  dicyclomine 20 milliGRAM(s) Oral three times a day before meals  furosemide    Tablet 40 milliGRAM(s) Oral daily  heparin   Injectable 5000 Unit(s) SubCutaneous every 12 hours  levothyroxine 88 MICROGram(s) Oral daily  pantoprazole    Tablet 40 milliGRAM(s) Oral before breakfast  QUEtiapine 25 milliGRAM(s) Oral at bedtime  sacubitril 49 mG/valsartan 51 mG 1 Tablet(s) Oral every 12 hours  tamsulosin 0.4 milliGRAM(s) Oral at bedtime    PRN MEDICATIONS  acetaminophen     Tablet .. 650 milliGRAM(s) Oral every 6 hours PRN  sucralfate 1 Gram(s) Oral every 6 hours PRN    VITALS  T(F): 97.6 (01-30-25 @ 23:24), Max: 97.6 (01-30-25 @ 16:06)  HR: 107 (01-31-25 @ 05:43) (85 - 107)  BP: 117/79 (01-31-25 @ 05:43) (108/63 - 144/84)  RR: 18 (01-31-25 @ 05:43) (18 - 18)  SpO2: 96% (01-31-25 @ 05:43) (94% - 98%)    Physical Exam:   GENERAL: NAD, lying in bed comfortably  HEAD:  Atraumatic, normocephalic  EYES: EOMI, PERRL  NECK: Supple, trachea midline, no JVD  HEART: Regular rate and rhythm  LUNGS: bibasilar crackles Unlabored respirations.  Clear to auscultation bilaterally, no  wheezing, or rhonchi  ABDOMEN: Soft, nontender, nondistended, +BS  EXTREMITIES: 1+ edema 2+ peripheral pulses bilaterally. No clubbing, cyanosis,  NERVOUS SYSTEM:  A&Ox3, moving all extremities, no focal deficits           LABS             9.5    5.46  )-----------( 176      ( 01-30-25 @ 05:32 )             30.5     147  |  117  |  31  -------------------------<  82   01-30-25 @ 05:32  3.7  |  19  |  1.8    Ca      8.3     01-30-25 @ 05:32  Phos   2.7     01-30-25 @ 05:32  Mg     1.4     01-30-25 @ 05:32    TPro  6.0  /  Alb  3.9  /  TBili  0.4  /  DBili  x   /  AST  13  /  ALT  11  /  AlkPhos  81  /  GGT  x     01-29-25 @ 22:30      Troponin T, High Sensitivity Result: 156 ng/L (01-30-25 @ 05:32)  Troponin T, High Sensitivity Result: 149 ng/L (01-29-25 @ 22:30)  Pro-Brain Natriuretic Peptide: 36050 pg/mL (01-29-25 @ 22:30)    Urinalysis Basic - ( 30 Jan 2025 05:32 )    Color: x / Appearance: x / SG: x / pH: x  Gluc: 82 mg/dL / Ketone: x  / Bili: x / Urobili: x   Blood: x / Protein: x / Nitrite: x   Leuk Esterase: x / RBC: x / WBC x   Sq Epi: x / Non Sq Epi: x / Bacteria: x          IMAGING SUBJECTIVE/OVERNIGHT EVENTS  Today is hospital day 2d. This morning patient was seen and examined at bedside, resting comfortably in bed. No acute or major events overnight.      MEDICATIONS  STANDING MEDICATIONS  chlorhexidine 2% Cloths 1 Application(s) Topical daily  dicyclomine 20 milliGRAM(s) Oral three times a day before meals  furosemide    Tablet 40 milliGRAM(s) Oral daily  heparin   Injectable 5000 Unit(s) SubCutaneous every 12 hours  levothyroxine 88 MICROGram(s) Oral daily  pantoprazole    Tablet 40 milliGRAM(s) Oral before breakfast  QUEtiapine 25 milliGRAM(s) Oral at bedtime  sacubitril 49 mG/valsartan 51 mG 1 Tablet(s) Oral every 12 hours  tamsulosin 0.4 milliGRAM(s) Oral at bedtime    PRN MEDICATIONS  acetaminophen     Tablet .. 650 milliGRAM(s) Oral every 6 hours PRN  sucralfate 1 Gram(s) Oral every 6 hours PRN    VITALS  T(F): 97.6 (01-30-25 @ 23:24), Max: 97.6 (01-30-25 @ 16:06)  HR: 107 (01-31-25 @ 05:43) (85 - 107)  BP: 117/79 (01-31-25 @ 05:43) (108/63 - 144/84)  RR: 18 (01-31-25 @ 05:43) (18 - 18)  SpO2: 96% (01-31-25 @ 05:43) (94% - 98%)    Physical Exam:   GENERAL: NAD, lying in bed comfortably  HEAD:  Atraumatic, normocephalic  EYES: EOMI, PERRL  NECK: Supple, trachea midline, no JVD  HEART: Regular rate and rhythm  LUNGS: bibasilar crackles Unlabored respirations.  Clear to auscultation bilaterally, no  wheezing, or rhonchi  ABDOMEN: Soft, nontender, nondistended, +BS  EXTREMITIES: 1+ edema 2+ peripheral pulses bilaterally. No clubbing, cyanosis,  NERVOUS SYSTEM:  A&Ox3, moving all extremities, no focal deficits           LABS             9.5    5.46  )-----------( 176      ( 01-30-25 @ 05:32 )             30.5     147  |  117  |  31  -------------------------<  82   01-30-25 @ 05:32  3.7  |  19  |  1.8    Ca      8.3     01-30-25 @ 05:32  Phos   2.7     01-30-25 @ 05:32  Mg     1.4     01-30-25 @ 05:32    TPro  6.0  /  Alb  3.9  /  TBili  0.4  /  DBili  x   /  AST  13  /  ALT  11  /  AlkPhos  81  /  GGT  x     01-29-25 @ 22:30      Troponin T, High Sensitivity Result: 156 ng/L (01-30-25 @ 05:32)  Troponin T, High Sensitivity Result: 149 ng/L (01-29-25 @ 22:30)  Pro-Brain Natriuretic Peptide: 56397 pg/mL (01-29-25 @ 22:30)    Urinalysis Basic - ( 30 Jan 2025 05:32 )    Color: x / Appearance: x / SG: x / pH: x  Gluc: 82 mg/dL / Ketone: x  / Bili: x / Urobili: x   Blood: x / Protein: x / Nitrite: x   Leuk Esterase: x / RBC: x / WBC x   Sq Epi: x / Non Sq Epi: x / Bacteria: x          IMAGING

## 2025-01-31 NOTE — DISCHARGE NOTE NURSING/CASE MANAGEMENT/SOCIAL WORK - PATIENT PORTAL LINK FT
You can access the FollowMyHealth Patient Portal offered by [facility] by registering at the following website: [email or web address]. By joining [email or web address] FollowMyHealth portal, you will also be able to view your health information using other applications (apps) compatible with our system.

## 2025-01-31 NOTE — DISCHARGE NOTE PROVIDER - NSDCHHCONTACT_GEN_ALL_CORE_FT
Lomotil 2.5mg by mouth PRN diarrhea   As certified below, I, or a nurse practitioner or physician assistant working with me, had a face-to-face encounter that meets the physician face-to-face encounter requirements.

## 2025-01-31 NOTE — DISCHARGE NOTE PROVIDER - ATTENDING DISCHARGE PHYSICAL EXAMINATION:
Vital Signs Last 24 Hrs  T(C): 36.4 (31 Jan 2025 08:16), Max: 36.4 (30 Jan 2025 16:06)  T(F): 97.5 (31 Jan 2025 08:16), Max: 97.6 (30 Jan 2025 16:06)  HR: 98 (31 Jan 2025 08:16) (85 - 107)  BP: 110/73 (31 Jan 2025 08:16) (110/73 - 144/84)  BP(mean): 85 (31 Jan 2025 08:16) (85 - 105)  RR: 18 (31 Jan 2025 08:16) (18 - 18)  SpO2: 95% (31 Jan 2025 08:16) (95% - 98%)    Parameters below as of 31 Jan 2025 08:16  Patient On (Oxygen Delivery Method): room air    Physical Exam:   GENERAL: NAD, lying in bed comfortably  HEAD:  Atraumatic, normocephalic  EYES: EOMI, PERRL  NECK: Supple, trachea midline, no JVD  HEART: Regular rate and rhythm  LUNGS: bibasilar crackles Unlabored respirations.  Clear to auscultation bilaterally, no  wheezing, or rhonchi  ABDOMEN: Soft, nontender, nondistended, +BS  EXTREMITIES: 1+ edema 2+ peripheral pulses bilaterally. No clubbing, cyanosis,  NERVOUS SYSTEM:  A&Ox3, moving all extremities, no focal deficits

## 2025-01-31 NOTE — DISCHARGE NOTE PROVIDER - NSDCFUSCHEDAPPT_GEN_ALL_CORE_FT
Danny Hines  [facility]  UROLOGY [address] [facility]  Scheduled Appointment: 04/25/2025

## 2025-01-31 NOTE — PATIENT PROFILE ADULT - STATED REASON FOR ADMISSION
He's been getting SOB, has a cough and some wheezing. Dr Miller wants him here to get IV Lasix, he thinks it might be his CHF - daughter  shortness of breath

## 2025-01-31 NOTE — DISCHARGE NOTE PROVIDER - CARE PROVIDER_API CALL
Garry Jarrett  Interventional Cardiology  [address] [place] [address], [address]  [place] [address]  Phone: [phone]  Fax: [phone]  Established Patient  Follow Up Time: 1 week    Juan Jose Carroll  Internal Medicine  [address] [place] [address]  Phone: [phone]  Fax: [phone]  Established Patient  Follow Up Time: 2 weeks

## 2025-01-31 NOTE — DISCHARGE NOTE PROVIDER - HOSPITAL COURSE
89 year old male, past medical history htn, hld, chf, ckd, who presents with dyspnea on exertion, mild non-prod cough, and b/l LE edema x 2-3 days. Patient recently started on lasix every other day increased to daily x3 days ago. denies f/c, chest pain, nausea/vomiting. Patient follows with cardiologist, Dr. Jarrett, who told him to come to the hospital.    Triage VS: /96, , RR 22, T 97.8F, SpO2 95% on RA.  Labs: CBC with H/H 10.1/32.2, MCV 94.4, RDW 14.6, CMP with creat 1.8, HS troponin 149, pro-BNP 04230.  ECG: sinus tach with occasional PVCs.  CXR: bibasilar and interstitial opacities.    ED interventions: IV furosemide 40mg.    Medicine course: IV lasix BID given, cardio consulted, recommended that if pt makes urine then can be dc'd and outpt fu. Mg was repleted. Pt safe and stable for discharge.       A/P:   #SOB 2/2 acute on chronic HFrEF  LVEF 39% in 2020 (TTE in [facility]).  ECG: sinus tach with occasional PVCs.  CXR reviewed and itnerpreted bibasilar and interstitial opacities  dw Dr. Jarrett  - Continue IV Lasix 40mg BID  - Monitor accurate I&O, daily weight.  - Telemetry monitoring.  - 1.5L fluid restriction.  - Continue Entresto 49-51   - Cont metoprolol tartrate 25 BID  -as per cardio, produces urine and chf can be managed outpt    #CKD  - Cr 1.8, at baseline.  - Follows Dr. Rodriguez.  - Continue sodium bicarb 650 TID.    #Hypothyroidism  - Continue levothyroxine 88mcg.    #BPH  - Continue tamsulosin 0.4mg QHS.    #Anxiety/Insomnia  - Cont seroquel 25mg qD    In summary: 89 year old male, past medical history htn, hld, chf, ckd, who presents with dyspnea on exertion, mild non-prod cough, and b/l LE edema x 2-3 days. Presents with HFmREF exacerbation, treated with IV lasix BID. Cardio consulted who recommended outpt fu. Discussion of discharge plan of care, including discharge diagnoses, medication reconciliation, and follow-ups was conducted with Dr. Baca on 1/31 at 9AM and discharge was approved.

## 2025-01-31 NOTE — DISCHARGE NOTE PROVIDER - NSDCCPCAREPLAN_GEN_ALL_CORE_FT
PRINCIPAL DISCHARGE DIAGNOSIS  Diagnosis: CHF exacerbation  Assessment and Plan of Treatment: You were found to have a congestive heart failure exacerbation ( explained below). You were treated with IV diuretics. Please follow up with your cardiologist upon discharge.   Congestive Heart Failure (CHF)  Congestive heart failure is a chronic condition in which the heart has trouble pumping blood. In some cases of heart failure, fluid may back up into your lungs or you may have swelling (edema) in your lower legs. There are many causes of heart failure including high blood pressure, coronary artery disease, abnormal heart valves, heart muscle disease, lung disease, diabetes, etc. Symptoms include shortness of breath with activity or when lying flat, cough, swelling of the legs, fatigue, or increased urination during the night. Please check your weight daily and if it increases 2-3 pounds in 1 day, take an extra lasix dose and call your cardiologist.   Treatment is aimed at managing the symptoms of heart failure and may include lifestyle changes, medications, or surgical procedures. Take medicines only as directed by your health care provider and do not stop unless instructed to do so. Eat heart-healthy foods with low or no trans/saturated fats, cholesterol and salt. Weigh yourself every day for early recognition of fluid accumulation.  SEEK IMMEDIATE MEDICAL CARE IF YOU HAVE ANY OF THE FOLLOWING SYMPTOMS: shortness of breath, change in mental status, chest pain, lightheadedness/dizziness/fainting, or worsening of symptoms including not being able to conduct normal physical activity.

## 2025-01-31 NOTE — DISCHARGE NOTE NURSING/CASE MANAGEMENT/SOCIAL WORK - FINANCIAL ASSISTANCE
[facility] provides services at a reduced cost to those who are determined to be eligible through [facility]’s financial assistance program. Information regarding [facility]’s financial assistance program can be found by going to [email or web address] or by calling [phone].

## 2025-01-31 NOTE — PATIENT PROFILE ADULT - FALL HARM RISK - HARM RISK INTERVENTIONS

## 2025-01-31 NOTE — PROGRESS NOTE ADULT - ASSESSMENT
89 year old male, past medical history htn, hld, chf, ckd, who presents with dyspnea on exertion, mild non-prod cough, and b/l LE edema x 2-3 days, sent by cardiologist.    #SOB 2/2 acute on chronic HFrEF  LVEF 39% in 2020 (TTE in [facility]).  ECG: sinus tach with occasional PVCs.  CXR reviewed and itnerpreted bibasilar and interstitial opacities  van Jarrett  - Continue IV Lasix 40mg BID,  - Monitor accurate I&O, daily weight.  - Telemetry monitoring.  - 1.5L fluid restriction.  - Continue Entresto 49-51   - Cont metoprolol tartrate 25 BID    #CKD  - Cr 1.8, at baseline.  - Follows Dr. Rodriguez.  - Continue sodium bicarb 650 TID.    #Hypothyroidism  - Continue levothyroxine 88mcg.    #BPH  - Continue tamsulosin 0.4mg QHS.    #Anxiety/Insomnia  - Cont seroquel 25mg qD    DVT PPX, Heparin    #Progress Note Handoff  Pending (specify): Cont IV lasix, adp 24h  Family discussion: van pt regarding plan of care  Disposition: Tele, from home 89 year old male, past medical history htn, hld, chf, ckd, who presents with dyspnea on exertion, mild non-prod cough, and b/l LE edema x 2-3 days, sent by cardiologist.    #SOB 2/2 acute on chronic HFrEF  LVEF 39% in 2020 (TTE in [place]).  ECG: sinus tach with occasional PVCs.  CXR reviewed and itnerpreted bibasilar and interstitial opacities  van Jarrett  - Continue IV Lasix 40mg BID,  - Monitor accurate I&O, daily weight.  - Telemetry monitoring.  - 1.5L fluid restriction.  - Continue Entresto 49-51   - Cont metoprolol tartrate 25 BID  -as per cardio, if pt produces urine with po lasix this am, then outpt management of chf considering age    #CKD  - Cr 1.8, at baseline.  - Follows Dr. Rodriguez.  - Continue sodium bicarb 650 TID.    #Hypothyroidism  - Continue levothyroxine 88mcg.    #BPH  - Continue tamsulosin 0.4mg QHS.    #Anxiety/Insomnia  - Cont seroquel 25mg qD    DVT PPX, Heparin    #Progress Note Handoff  Pending (specify): Cont IV lasix, adp 24h  Family discussion: van pt regarding plan of care  Disposition: Tele, from home

## 2025-01-31 NOTE — DISCHARGE NOTE PROVIDER - NSDCMRMEDTOKEN_GEN_ALL_CORE_FT
aspirin 81 mg oral capsule: 1 cap(s) orally once a day  dicyclomine 20 mg oral tablet: 1 tab(s) orally 3 times a day (with meals)  Entresto 49 mg-51 mg oral tablet: 1 tab(s) orally 2 times a day  furosemide 40 mg oral tablet: 1 tab(s) orally once a day  levothyroxine 88 mcg (0.088 mg) oral tablet: 1 tab(s) orally once a day  metoprolol tartrate 25 mg oral tablet: 1 tab(s) orally 2 times a day  omeprazole 40 mg oral delayed release capsule: 1 cap(s) orally once a day (at bedtime)  Seroquel 25 mg oral tablet: 1 tab(s) orally once a day  tamsulosin 0.4 mg oral capsule: 1 cap(s) orally once a day

## 2025-02-06 DIAGNOSIS — I50.23 ACUTE ON CHRONIC SYSTOLIC (CONGESTIVE) HEART FAILURE: ICD-10-CM

## 2025-02-06 DIAGNOSIS — N18.9 CHRONIC KIDNEY DISEASE, UNSPECIFIED: ICD-10-CM

## 2025-02-06 DIAGNOSIS — E03.9 HYPOTHYROIDISM, UNSPECIFIED: ICD-10-CM

## 2025-02-06 DIAGNOSIS — G47.00 INSOMNIA, UNSPECIFIED: ICD-10-CM

## 2025-02-06 DIAGNOSIS — G31.84 MILD COGNITIVE IMPAIRMENT OF UNCERTAIN OR UNKNOWN ETIOLOGY: ICD-10-CM

## 2025-02-06 DIAGNOSIS — E78.5 HYPERLIPIDEMIA, UNSPECIFIED: ICD-10-CM

## 2025-02-06 DIAGNOSIS — N40.0 BENIGN PROSTATIC HYPERPLASIA WITHOUT LOWER URINARY TRACT SYMPTOMS: ICD-10-CM

## 2025-03-26 PROBLEM — N18.9 CHRONIC KIDNEY DISEASE, UNSPECIFIED: Chronic | Status: ACTIVE | Noted: 2025-01-30

## 2025-03-26 PROBLEM — I10 ESSENTIAL (PRIMARY) HYPERTENSION: Chronic | Status: ACTIVE | Noted: 2025-01-30

## 2025-04-03 ENCOUNTER — APPOINTMENT (OUTPATIENT)
Dept: PODIATRY | Facility: HOME HEALTH | Age: 89
End: 2025-04-03
Payer: MEDICARE

## 2025-04-03 DIAGNOSIS — M79.675 TINEA UNGUIUM: ICD-10-CM

## 2025-04-03 DIAGNOSIS — L60.2 ONYCHOGRYPHOSIS: ICD-10-CM

## 2025-04-03 DIAGNOSIS — M79.674 TINEA UNGUIUM: ICD-10-CM

## 2025-04-03 DIAGNOSIS — B35.1 TINEA UNGUIUM: ICD-10-CM

## 2025-04-03 PROBLEM — Z86.79 PERSONAL HISTORY OF OTHER DISEASES OF THE CIRCULATORY SYSTEM: Chronic | Status: ACTIVE | Noted: 2025-01-30

## 2025-04-03 PROBLEM — E78.5 HYPERLIPIDEMIA, UNSPECIFIED: Chronic | Status: ACTIVE | Noted: 2025-01-30

## 2025-04-03 PROCEDURE — 99347 HOME/RES VST EST SF MDM 20: CPT | Mod: 25

## 2025-04-03 PROCEDURE — 11721 DEBRIDE NAIL 6 OR MORE: CPT

## 2025-04-05 PROBLEM — B35.1 PAIN DUE TO ONYCHOMYCOSIS OF TOENAILS OF BOTH FEET: Status: ACTIVE | Noted: 2025-04-05

## 2025-04-05 PROBLEM — L60.2 ONYCHOGRYPHOSIS: Status: ACTIVE | Noted: 2025-04-05

## 2025-04-05 PROBLEM — B35.1 ONYCHOMYCOSIS OF TOENAIL: Status: ACTIVE | Noted: 2025-04-05

## 2025-04-10 ENCOUNTER — APPOINTMENT (OUTPATIENT)
Dept: ORTHOPEDIC SURGERY | Facility: CLINIC | Age: 89
End: 2025-04-10
Payer: MEDICARE

## 2025-04-10 DIAGNOSIS — G56.01 CARPAL TUNNEL SYNDROME, RIGHT UPPER LIMB: ICD-10-CM

## 2025-04-10 PROCEDURE — 99203 OFFICE O/P NEW LOW 30 MIN: CPT

## 2025-04-30 ENCOUNTER — TRANSCRIPTION ENCOUNTER (OUTPATIENT)
Age: 89
End: 2025-04-30

## 2025-04-30 ENCOUNTER — OUTPATIENT (OUTPATIENT)
Dept: OUTPATIENT SERVICES | Facility: HOSPITAL | Age: 89
LOS: 1 days | Discharge: HOME | End: 2025-04-30
Payer: MEDICARE

## 2025-04-30 ENCOUNTER — APPOINTMENT (OUTPATIENT)
Dept: ORTHOPEDIC SURGERY | Facility: AMBULATORY SURGERY CENTER | Age: 89
End: 2025-04-30

## 2025-04-30 VITALS
TEMPERATURE: 98 F | SYSTOLIC BLOOD PRESSURE: 134 MMHG | OXYGEN SATURATION: 99 % | HEART RATE: 73 BPM | WEIGHT: 181 LBS | RESPIRATION RATE: 18 BRPM | DIASTOLIC BLOOD PRESSURE: 66 MMHG | HEIGHT: 67 IN

## 2025-04-30 VITALS
TEMPERATURE: 98 F | RESPIRATION RATE: 20 BRPM | OXYGEN SATURATION: 99 % | SYSTOLIC BLOOD PRESSURE: 127 MMHG | HEART RATE: 73 BPM | DIASTOLIC BLOOD PRESSURE: 61 MMHG

## 2025-04-30 DIAGNOSIS — G56.01 CARPAL TUNNEL SYNDROME, RIGHT UPPER LIMB: ICD-10-CM

## 2025-04-30 PROCEDURE — 64721 CARPAL TUNNEL SURGERY: CPT | Mod: RT

## 2025-04-30 RX ORDER — IBUPROFEN 200 MG
1 TABLET ORAL
Qty: 20 | Refills: 0
Start: 2025-04-30

## 2025-04-30 NOTE — ASU DISCHARGE PLAN (ADULT/PEDIATRIC) - FINANCIAL ASSISTANCE
[facility] provides services at a reduced cost to those who are determined to be eligible through [facility]’s financial assistance program. Information regarding [facility]’s financial assistance program can be found by going to [email or web address] or by calling [phone].

## 2025-04-30 NOTE — ASU DISCHARGE PLAN (ADULT/PEDIATRIC) - NS MD DC FALL RISK RISK
For information on Fall & Injury Prevention, visit: [email or web address] OR  [email or web address] OR  https://www.cdc.gov/steadi/patient.html

## 2025-04-30 NOTE — ASU DISCHARGE PLAN (ADULT/PEDIATRIC) - NPI NUMBER (FOR SYSADMIN USE ONLY) :
Last office visit date: 08/18/2023    Next appointment scheduled?: Yes     Number of refills given: 0    Clopidogrel 75 mg 90 tablets 0 refills 06/19/2023    atebolol 50 mg 180 tablets 0 refills 06/19/2023     Plavix Refill Protocol - 12 Month Protocol Passed 08/24/2023 04:13 PM   Protocol Details  Seen by prescribing provider or same department within the last 12 months or has a future appt in 3 months - IF FAILED PLEASE LOOK AT CHART REVIEW FOR LAST VISIT AND PROCEED ACCORDINGLY    No conflicting PPI on medication list    Normal HGB in past 12 months looking at last value    Normal PLT in past 12 months looking at last value    Medication (including dose and sig) on current meds list          
[[id number]]

## 2025-05-02 ENCOUNTER — APPOINTMENT (OUTPATIENT)
Dept: UROLOGY | Facility: CLINIC | Age: 89
End: 2025-05-02
Payer: MEDICARE

## 2025-05-02 DIAGNOSIS — C61 MALIGNANT NEOPLASM OF PROSTATE: ICD-10-CM

## 2025-05-02 DIAGNOSIS — R39.9 UNSPECIFIED SYMPTOMS AND SIGNS INVOLVING THE GENITOURINARY SYSTEM: ICD-10-CM

## 2025-05-02 PROCEDURE — 99213 OFFICE O/P EST LOW 20 MIN: CPT

## 2025-05-06 DIAGNOSIS — I25.10 ATHEROSCLEROTIC HEART DISEASE OF NATIVE CORONARY ARTERY WITHOUT ANGINA PECTORIS: ICD-10-CM

## 2025-05-06 DIAGNOSIS — E78.5 HYPERLIPIDEMIA, UNSPECIFIED: ICD-10-CM

## 2025-05-06 DIAGNOSIS — G56.01 CARPAL TUNNEL SYNDROME, RIGHT UPPER LIMB: ICD-10-CM

## 2025-05-06 DIAGNOSIS — Z79.82 LONG TERM (CURRENT) USE OF ASPIRIN: ICD-10-CM

## 2025-05-06 DIAGNOSIS — I10 ESSENTIAL (PRIMARY) HYPERTENSION: ICD-10-CM

## 2025-05-08 ENCOUNTER — APPOINTMENT (OUTPATIENT)
Dept: ORTHOPEDIC SURGERY | Facility: CLINIC | Age: 89
End: 2025-05-08
Payer: MEDICARE

## 2025-05-08 DIAGNOSIS — G56.01 CARPAL TUNNEL SYNDROME, RIGHT UPPER LIMB: ICD-10-CM

## 2025-05-08 PROCEDURE — 99024 POSTOP FOLLOW-UP VISIT: CPT

## 2025-05-28 ENCOUNTER — NON-APPOINTMENT (OUTPATIENT)
Age: 89
End: 2025-05-28

## 2025-06-09 ENCOUNTER — NON-APPOINTMENT (OUTPATIENT)
Age: 89
End: 2025-06-09

## 2025-06-27 ENCOUNTER — APPOINTMENT (OUTPATIENT)
Facility: CLINIC | Age: 89
End: 2025-06-27
Payer: MEDICARE

## 2025-06-27 PROCEDURE — 99024 POSTOP FOLLOW-UP VISIT: CPT

## 2025-07-08 ENCOUNTER — APPOINTMENT (OUTPATIENT)
Dept: PODIATRY | Facility: HOME HEALTH | Age: 89
End: 2025-07-08
Payer: MEDICARE

## 2025-07-08 PROCEDURE — 99347 HOME/RES VST EST SF MDM 20: CPT | Mod: 25

## 2025-07-08 PROCEDURE — 11721 DEBRIDE NAIL 6 OR MORE: CPT

## 2025-08-02 NOTE — PATIENT PROFILE ADULT - FUNCTIONAL SCREEN CURRENT LEVEL: SWALLOWING (IF SCORE 2 OR MORE FOR ANY ITEM, CONSULT REHAB SERVICES), MLM)
Assumed care of Danie Mccain from Dr. Nunes, discussed with him, chart reviewed, awaiting imaging results.    Vitals:    08/02/25 0701   BP: (!) 143/91   Pulse: (!) 102   Resp:    Temp:      ED Course/MDM:  XR PELVIS 1 VIEW   Final Result   IMPRESSION:      Right lower leg: There is no definite fracture. There is mild degenerative   change in the knee and ankle joint. There is mild likely degenerative   sclerosis in the lateral tibial plateau. There is arterial calcification.      Foot: There is a moderate hallux valgus deformity with moderate first MTP   joint degenerative change. There is mild degenerative change in the   midfoot. There is subtle cortical indistinctness of the fifth metatarsal   head. Correlation with any clinical signs or symptoms of   infection/osteomyelitis is advised. There is a questionable small amount of   subjacent soft tissue irregularity as well. There is a small chronic   appearing erosion within the lateral aspect of the third middle phalanx.   There is pronounced dorsal forefoot soft tissue swelling.      Pelvis: There is no fracture or dislocation. There is mild bilateral hip   joint space narrowing.         Electronically Signed by: Bob Gonzalez MD   Signed on: 8/2/2025 7:16 AM   Created on Workstation ID: [id number]   Signed on Workstation ID: [id number]      XR TIBIA FIBULA 2 VIEWS RIGHT   Final Result   IMPRESSION:      Right lower leg: There is no definite fracture. There is mild degenerative   change in the knee and ankle joint. There is mild likely degenerative   sclerosis in the lateral tibial plateau. There is arterial calcification.      Foot: There is a moderate hallux valgus deformity with moderate first MTP   joint degenerative change. There is mild degenerative change in the   midfoot. There is subtle cortical indistinctness of the fifth metatarsal   head. Correlation with any clinical signs or symptoms of   infection/osteomyelitis is advised. There is a  questionable small amount of   subjacent soft tissue irregularity as well. There is a small chronic   appearing erosion within the lateral aspect of the third middle phalanx.   There is pronounced dorsal forefoot soft tissue swelling.      Pelvis: There is no fracture or dislocation. There is mild bilateral hip   joint space narrowing.         Electronically Signed by: Bob Gonzalez MD   Signed on: 8/2/2025 7:16 AM   Created on Workstation ID: [id number]   Signed on Workstation ID: [id number]      XR FOOT 3 OR MORE VIEWS RIGHT   Final Result   IMPRESSION:      Right lower leg: There is no definite fracture. There is mild degenerative   change in the knee and ankle joint. There is mild likely degenerative   sclerosis in the lateral tibial plateau. There is arterial calcification.      Foot: There is a moderate hallux valgus deformity with moderate first MTP   joint degenerative change. There is mild degenerative change in the   midfoot. There is subtle cortical indistinctness of the fifth metatarsal   head. Correlation with any clinical signs or symptoms of   infection/osteomyelitis is advised. There is a questionable small amount of   subjacent soft tissue irregularity as well. There is a small chronic   appearing erosion within the lateral aspect of the third middle phalanx.   There is pronounced dorsal forefoot soft tissue swelling.      Pelvis: There is no fracture or dislocation. There is mild bilateral hip   joint space narrowing.         Electronically Signed by: Bob Gnozalez MD   Signed on: 8/2/2025 7:16 AM   Created on Workstation ID: [id number]   Signed on Workstation ID: [id number]      CT HEAD WO CONTRAST   Final Result   IMPRESSION:      No acute intracranial findings.         Electronically Signed by: Hernesto Houser MD   Signed on: 8/2/2025 6:33 AM   Created on Workstation ID: [id number]   Signed on Workstation ID: [id number]      Diagnostics reviewed and interpreted personally incident to care.    Splint  Application    Date/Time: 8/2/2025 5:54 AM    Performed by: Herbert Stratton DO  Authorized by: Herbert Stratton DO    Consent:     Consent obtained:  Verbal    Consent given by:  Patient    Risks discussed:  Pain, swelling, discoloration and numbness  Universal protocol:     Imaging studies available: yes      Immediately prior to procedure a time out was called: yes      Patient identity confirmed:  Arm band  Pre-procedure details:     Distal neurologic exam:  Normal    Distal perfusion: distal pulses diminished    Procedure details:     Location:  Foot    Foot location:  R foot    Lower extremity splint type: post op shoe.    Supplies:  Prefabricated splint    Attestation: Splint applied and adjusted personally by me    Post-procedure details:     Distal neurologic exam:  Unchanged    Distal perfusion: unchanged      Procedure completion:  Tolerated well, no immediate complications    Post-procedure imaging: not applicable          Diagnosis  The primary encounter diagnosis was Closed head injury without loss of consciousness, initial encounter. Diagnoses of Swelling of right foot, Fall against object, and Anticoagulant long-term use were also pertinent to this visit.    Follow Up:  Peter Hogue, APNP  [address] [facility] [address]  [phone]    Schedule an appointment as soon as possible for a visit in 1 week  As needed, If symptoms worsen        Disposition:  Discharge 8/2/2025  7:25 AM  Danie Mccain discharge to home/self care.           Herbert Stratton DO  08/02/25 0729     0 = swallows foods/liquids without difficulty